# Patient Record
Sex: FEMALE | Race: WHITE | Employment: PART TIME | URBAN - METROPOLITAN AREA
[De-identification: names, ages, dates, MRNs, and addresses within clinical notes are randomized per-mention and may not be internally consistent; named-entity substitution may affect disease eponyms.]

---

## 2017-07-31 LAB
ABO + RH BLD: NORMAL
ABO + RH BLD: NORMAL
HBV SURFACE AG SERPL QL IA: NON REACTIVE
HIV 1+2 AB+HIV1 P24 AG SERPL QL IA: NON REACTIVE
RUBELLA ABY IGG: NORMAL

## 2017-11-01 ENCOUNTER — TRANSFERRED RECORDS (OUTPATIENT)
Dept: HEALTH INFORMATION MANAGEMENT | Facility: CLINIC | Age: 27
End: 2017-11-01

## 2017-11-01 LAB — PAP SMEAR - HIM PATIENT REPORTED: NEGATIVE

## 2018-01-07 ENCOUNTER — HEALTH MAINTENANCE LETTER (OUTPATIENT)
Age: 28
End: 2018-01-07

## 2018-01-22 LAB — GROUP B STREP PCR: NORMAL

## 2018-02-17 ENCOUNTER — HOSPITAL ENCOUNTER (OUTPATIENT)
Facility: CLINIC | Age: 28
Discharge: HOME | End: 2018-02-18
Attending: OBSTETRICS & GYNECOLOGY | Admitting: OBSTETRICS & GYNECOLOGY
Payer: COMMERCIAL

## 2018-02-17 PROCEDURE — 59025 FETAL NON-STRESS TEST: CPT | Mod: 26 | Performed by: OBSTETRICS & GYNECOLOGY

## 2018-02-17 PROCEDURE — 59025 FETAL NON-STRESS TEST: CPT

## 2018-02-17 PROCEDURE — G0463 HOSPITAL OUTPT CLINIC VISIT: HCPCS | Mod: 25

## 2018-02-17 RX ORDER — FERROUS SULFATE 325(65) MG
325 TABLET ORAL
Status: ON HOLD | COMMUNITY
End: 2018-02-20

## 2018-02-17 RX ORDER — ONDANSETRON 2 MG/ML
4 INJECTION INTRAMUSCULAR; INTRAVENOUS EVERY 6 HOURS PRN
Status: DISCONTINUED | OUTPATIENT
Start: 2018-02-17 | End: 2018-02-18 | Stop reason: HOSPADM

## 2018-02-17 NOTE — IP AVS SNAPSHOT
MRN:[id number]                      After Visit Summary   2/17/2018    Elvia Dickens    MRN: [id number]           Thank you!     Thank you for choosing [facility] for your care. Our goal is always to provide you with excellent care. Hearing back from our patients is one way we can continue to improve our services. Please take a few minutes to complete the written survey that you may receive in the mail after you visit with us. Thank you!        Patient Information     Date Of Birth          1990        About your hospital stay     You were admitted on:  February 17, 2018 You last received care in the:  [facility]    You were discharged on:  February 17, 2018       Who to Call     For medical emergencies, please call 911.  For non-urgent questions about your medical care, please call your primary care provider or clinic, [phone]          Attending Provider     Provider Ruben Newton MD OB/Gyn    Alexandra Martinez MD OB/Gyn       Primary Care Provider Office Phone # Fax #    Ary Sandhu [phone]      Further instructions from your care team       Discharge Instruction for Undelivered Patients      You were seen for: Labor Assessment  We Consulted: Dr Enrique  You had (Test or Medicine):non stress test,      Diet:   Drink 8 to 12 glasses of liquids (milk, juice, water) every day.  You may eat meals and snacks.     Activity:  Count fetal kicks everyday (see handout)  Call your doctor or nurse midwife if your baby is moving less than usual.     Call your provider if you notice:  Swelling in your face or increased swelling in your hands or legs.  Headaches that are not relieved by Tylenol (acetaminophen).  Changes in your vision (blurring: seeing spots or stars.)  Nausea (sick to your stomach) and vomiting (throwing up).   Weight gain of 5 pounds or more per week.  Heartburn that doesn't go away.  Signs of bladder infection: pain  "when you urinate (use the toilet), need to go more often and more urgently.  The bag of aviles (rupture of membranes) breaks, or you notice leaking in your underwear.  Bright red blood in your underwear.  Abdominal (lower belly) or stomach pain.  For first baby: Contractions (tightening) less than 5 minutes apart for one hour or more.  Second (plus) baby: Contractions (tightening) less than 10 minutes apart and getting stronger.  *If less than 34 weeks: Contractions (tightenings) more than 6 times in one hour.  Increase or change in vaginal discharge (note the color and amount)  You may have spotting from cervical check, this is normal      Follow-up:  As scheduled in the clinic          Pending Results     No orders found from 2/15/2018 to 2018.            Admission Information     Date & Time Provider Department Dept. Phone    2018 [phone]      Your Vitals Were     Last Period                   2017           [facility] Information     [facility] lets you send messages to your doctor, view your test results, renew your prescriptions, schedule appointments and more. To sign up, go to [email or web address] . Click on \"Log in\" on the left side of the screen, which will take you to the Welcome page. Then click on \"Sign up Now\" on the right side of the page.     You will be asked to enter the access code listed below, as well as some personal information. Please follow the directions to create your username and password.     Your access code is: [email or web address]  Expires: 2018 11:48 PM     Your access code will  in 90 days. If you need help or a new code, please call your [facility] clinic or [phone].        Care EveryWhere ID     This is your Care EveryWhere ID. This could be used by other organizations to access your [facility] medical records  [id number]        Equal Access to Services     LIYA CABRERA AH: Yash Cuello, " wapriscilada lukelsiadaha, qaybta kaalmada ernesto, liberty shraddhamone jimenez khalifdesirae jallohaaadalberto ah. So [facility] [phone].    ATENCIÓN: Si mata montez, tiene a [place] disposición servicios gratuitos de asistencia lingüística. Llame al [phone].    We comply with applicable federal civil rights laws and [place] laws. We do not discriminate on the basis of race, color, national origin, age, disability, sex, sexual orientation, or gender identity.               Review of your medicines      UNREVIEWED medicines. Ask your doctor about these medicines        Dose / Directions    ferrous sulfate 325 (65 FE) MG tablet   Commonly known as:  IRON        Dose:  325 mg   Take 325 mg by mouth daily (with breakfast)   Refills:  0       hydrocortisone 2.5 % cream   Commonly known as:  ANUSOL-HC        Place rectally 3 times daily   Refills:  0                Protect others around you: Learn how to safely use, store and throw away your medicines at www.disposemymeds.org.             Medication List: This is a list of all your medications and when to take them. Check marks below indicate your daily home schedule. Keep this list as a reference.      Medications           Morning Afternoon Evening Bedtime As Needed    ferrous sulfate 325 (65 FE) MG tablet   Commonly known as:  IRON   Take 325 mg by mouth daily (with breakfast)                                hydrocortisone 2.5 % cream   Commonly known as:  ANUSOL-HC   Place rectally 3 times daily

## 2018-02-17 NOTE — IP AVS SNAPSHOT
[address], [address]    [facility] [address]    Phone:  [phone]                                       After Visit Summary   2/17/2018    Elvia Dickens    MRN: [id number]           After Visit Summary Signature Page     I have received my discharge instructions, and my questions have been answered. I have discussed any challenges I see with this plan with the nurse or doctor.    ..........................................................................................................................................  Patient/Patient Representative Signature      ..........................................................................................................................................  Patient Representative Print Name and Relationship to Patient    ..................................................               ................................................  Date                                            Time    ..........................................................................................................................................  Reviewed by Signature/Title    ...................................................              ..............................................  Date                                                            Time

## 2018-02-18 ENCOUNTER — ANESTHESIA EVENT (OUTPATIENT)
Dept: OBGYN | Facility: CLINIC | Age: 28
End: 2018-02-18
Payer: COMMERCIAL

## 2018-02-18 ENCOUNTER — ANESTHESIA (OUTPATIENT)
Dept: OBGYN | Facility: CLINIC | Age: 28
End: 2018-02-18
Payer: COMMERCIAL

## 2018-02-18 ENCOUNTER — HOSPITAL ENCOUNTER (INPATIENT)
Facility: CLINIC | Age: 28
LOS: 2 days | Discharge: HOME | End: 2018-02-20
Attending: OBSTETRICS & GYNECOLOGY | Admitting: OBSTETRICS & GYNECOLOGY
Payer: COMMERCIAL

## 2018-02-18 VITALS — TEMPERATURE: 97.7 F | DIASTOLIC BLOOD PRESSURE: 70 MMHG | SYSTOLIC BLOOD PRESSURE: 109 MMHG

## 2018-02-18 LAB
ABO + RH BLD: NORMAL
ABO + RH BLD: NORMAL
BLD GP AB SCN SERPL QL: NORMAL
BLOOD BANK CMNT PATIENT-IMP: NORMAL
HGB BLD-MCNC: 9.9 G/DL (ref 11.7–15.7)
SPECIMEN EXP DATE BLD: NORMAL
T PALLIDUM IGG+IGM SER QL: NEGATIVE

## 2018-02-18 PROCEDURE — 27110038 ZZH RX 271: Performed by: ANESTHESIOLOGY

## 2018-02-18 PROCEDURE — 0KQM0ZZ REPAIR PERINEUM MUSCLE, OPEN APPROACH: ICD-10-PCS | Performed by: OBSTETRICS & GYNECOLOGY

## 2018-02-18 PROCEDURE — 86900 BLOOD TYPING SEROLOGIC ABO: CPT | Performed by: OBSTETRICS & GYNECOLOGY

## 2018-02-18 PROCEDURE — 72200001 ZZH LABOR CARE VAGINAL DELIVERY SINGLE

## 2018-02-18 PROCEDURE — 25000125 ZZHC RX 250: Performed by: OBSTETRICS & GYNECOLOGY

## 2018-02-18 PROCEDURE — 25000128 H RX IP 250 OP 636: Performed by: ANESTHESIOLOGY

## 2018-02-18 PROCEDURE — 86901 BLOOD TYPING SEROLOGIC RH(D): CPT | Performed by: OBSTETRICS & GYNECOLOGY

## 2018-02-18 PROCEDURE — 25000128 H RX IP 250 OP 636: Performed by: OBSTETRICS & GYNECOLOGY

## 2018-02-18 PROCEDURE — 86780 TREPONEMA PALLIDUM: CPT | Performed by: OBSTETRICS & GYNECOLOGY

## 2018-02-18 PROCEDURE — 25000132 ZZH RX MED GY IP 250 OP 250 PS 637: Performed by: OBSTETRICS & GYNECOLOGY

## 2018-02-18 PROCEDURE — 25000125 ZZHC RX 250: Performed by: ANESTHESIOLOGY

## 2018-02-18 PROCEDURE — 00HU33Z INSERTION OF INFUSION DEVICE INTO SPINAL CANAL, PERCUTANEOUS APPROACH: ICD-10-PCS | Performed by: ANESTHESIOLOGY

## 2018-02-18 PROCEDURE — 85018 HEMOGLOBIN: CPT | Performed by: OBSTETRICS & GYNECOLOGY

## 2018-02-18 PROCEDURE — 88307 TISSUE EXAM BY PATHOLOGIST: CPT | Mod: 26 | Performed by: OBSTETRICS & GYNECOLOGY

## 2018-02-18 PROCEDURE — 86850 RBC ANTIBODY SCREEN: CPT | Performed by: OBSTETRICS & GYNECOLOGY

## 2018-02-18 PROCEDURE — 12000037 ZZH R&B POSTPARTUM INTERMEDIATE

## 2018-02-18 PROCEDURE — 3E0R3BZ INTRODUCTION OF ANESTHETIC AGENT INTO SPINAL CANAL, PERCUTANEOUS APPROACH: ICD-10-PCS | Performed by: ANESTHESIOLOGY

## 2018-02-18 PROCEDURE — 36415 COLL VENOUS BLD VENIPUNCTURE: CPT | Performed by: OBSTETRICS & GYNECOLOGY

## 2018-02-18 PROCEDURE — G0463 HOSPITAL OUTPT CLINIC VISIT: HCPCS | Mod: 25

## 2018-02-18 PROCEDURE — 59025 FETAL NON-STRESS TEST: CPT

## 2018-02-18 PROCEDURE — 88307 TISSUE EXAM BY PATHOLOGIST: CPT | Performed by: OBSTETRICS & GYNECOLOGY

## 2018-02-18 PROCEDURE — 37000011 ZZH ANESTHESIA WARD SERVICE

## 2018-02-18 RX ORDER — CARBOPROST TROMETHAMINE 250 UG/ML
250 INJECTION, SOLUTION INTRAMUSCULAR
Status: DISCONTINUED | OUTPATIENT
Start: 2018-02-18 | End: 2018-02-18

## 2018-02-18 RX ORDER — LIDOCAINE 40 MG/G
CREAM TOPICAL
Status: DISCONTINUED | OUTPATIENT
Start: 2018-02-18 | End: 2018-02-18

## 2018-02-18 RX ORDER — SODIUM CHLORIDE, SODIUM LACTATE, POTASSIUM CHLORIDE, CALCIUM CHLORIDE 600; 310; 30; 20 MG/100ML; MG/100ML; MG/100ML; MG/100ML
INJECTION, SOLUTION INTRAVENOUS CONTINUOUS
Status: DISCONTINUED | OUTPATIENT
Start: 2018-02-18 | End: 2018-02-18

## 2018-02-18 RX ORDER — IBUPROFEN 400 MG/1
800 TABLET, FILM COATED ORAL EVERY 6 HOURS PRN
Status: DISCONTINUED | OUTPATIENT
Start: 2018-02-18 | End: 2018-02-20 | Stop reason: HOSPADM

## 2018-02-18 RX ORDER — ONDANSETRON 2 MG/ML
4 INJECTION INTRAMUSCULAR; INTRAVENOUS EVERY 6 HOURS PRN
Status: DISCONTINUED | OUTPATIENT
Start: 2018-02-18 | End: 2018-02-18

## 2018-02-18 RX ORDER — OXYTOCIN/0.9 % SODIUM CHLORIDE 30/500 ML
100 PLASTIC BAG, INJECTION (ML) INTRAVENOUS CONTINUOUS
Status: DISCONTINUED | OUTPATIENT
Start: 2018-02-18 | End: 2018-02-20 | Stop reason: HOSPADM

## 2018-02-18 RX ORDER — MISOPROSTOL 200 UG/1
800 TABLET ORAL
Status: DISCONTINUED | OUTPATIENT
Start: 2018-02-18 | End: 2018-02-20 | Stop reason: HOSPADM

## 2018-02-18 RX ORDER — METHYLERGONOVINE MALEATE 0.2 MG/ML
200 INJECTION INTRAVENOUS
Status: DISCONTINUED | OUTPATIENT
Start: 2018-02-18 | End: 2018-02-18

## 2018-02-18 RX ORDER — NALOXONE HYDROCHLORIDE 0.4 MG/ML
.1-.4 INJECTION, SOLUTION INTRAMUSCULAR; INTRAVENOUS; SUBCUTANEOUS
Status: DISCONTINUED | OUTPATIENT
Start: 2018-02-18 | End: 2018-02-18

## 2018-02-18 RX ORDER — HYDROCORTISONE 2.5 %
CREAM (GRAM) TOPICAL 3 TIMES DAILY PRN
Status: DISCONTINUED | OUTPATIENT
Start: 2018-02-18 | End: 2018-02-20 | Stop reason: HOSPADM

## 2018-02-18 RX ORDER — LIDOCAINE HCL/EPINEPHRINE/PF 2%-1:200K
VIAL (ML) INJECTION
Status: DISCONTINUED
Start: 2018-02-18 | End: 2018-02-18 | Stop reason: HOSPADM

## 2018-02-18 RX ORDER — AMOXICILLIN 250 MG
2 CAPSULE ORAL 2 TIMES DAILY PRN
Status: DISCONTINUED | OUTPATIENT
Start: 2018-02-18 | End: 2018-02-20 | Stop reason: HOSPADM

## 2018-02-18 RX ORDER — OXYTOCIN 10 [USP'U]/ML
10 INJECTION, SOLUTION INTRAMUSCULAR; INTRAVENOUS
Status: DISCONTINUED | OUTPATIENT
Start: 2018-02-18 | End: 2018-02-18

## 2018-02-18 RX ORDER — ACETAMINOPHEN 325 MG/1
650 TABLET ORAL EVERY 4 HOURS PRN
Status: DISCONTINUED | OUTPATIENT
Start: 2018-02-18 | End: 2018-02-18

## 2018-02-18 RX ORDER — DIPHENHYDRAMINE HCL 25 MG
25 CAPSULE ORAL
COMMUNITY
End: 2018-05-18

## 2018-02-18 RX ORDER — AMOXICILLIN 250 MG
1 CAPSULE ORAL 2 TIMES DAILY PRN
Status: DISCONTINUED | OUTPATIENT
Start: 2018-02-18 | End: 2018-02-20 | Stop reason: HOSPADM

## 2018-02-18 RX ORDER — ROPIVACAINE HYDROCHLORIDE 2 MG/ML
10 INJECTION, SOLUTION EPIDURAL; INFILTRATION; PERINEURAL ONCE
Status: COMPLETED | OUTPATIENT
Start: 2018-02-18 | End: 2018-02-18

## 2018-02-18 RX ORDER — EPHEDRINE SULFATE 50 MG/ML
5 INJECTION, SOLUTION INTRAMUSCULAR; INTRAVENOUS; SUBCUTANEOUS
Status: DISCONTINUED | OUTPATIENT
Start: 2018-02-18 | End: 2018-02-18

## 2018-02-18 RX ORDER — OXYCODONE HYDROCHLORIDE 5 MG/1
5 TABLET ORAL EVERY 4 HOURS PRN
Status: DISCONTINUED | OUTPATIENT
Start: 2018-02-18 | End: 2018-02-20 | Stop reason: HOSPADM

## 2018-02-18 RX ORDER — NALBUPHINE HYDROCHLORIDE 10 MG/ML
2.5-5 INJECTION, SOLUTION INTRAMUSCULAR; INTRAVENOUS; SUBCUTANEOUS EVERY 6 HOURS PRN
Status: DISCONTINUED | OUTPATIENT
Start: 2018-02-18 | End: 2018-02-18

## 2018-02-18 RX ORDER — ONDANSETRON 4 MG/1
4 TABLET, ORALLY DISINTEGRATING ORAL EVERY 6 HOURS PRN
Status: DISCONTINUED | OUTPATIENT
Start: 2018-02-18 | End: 2018-02-18

## 2018-02-18 RX ORDER — BISACODYL 10 MG
10 SUPPOSITORY, RECTAL RECTAL DAILY PRN
Status: DISCONTINUED | OUTPATIENT
Start: 2018-02-20 | End: 2018-02-20 | Stop reason: HOSPADM

## 2018-02-18 RX ORDER — NALOXONE HYDROCHLORIDE 0.4 MG/ML
.1-.4 INJECTION, SOLUTION INTRAMUSCULAR; INTRAVENOUS; SUBCUTANEOUS
Status: DISCONTINUED | OUTPATIENT
Start: 2018-02-18 | End: 2018-02-20 | Stop reason: HOSPADM

## 2018-02-18 RX ORDER — LIDOCAINE HYDROCHLORIDE AND EPINEPHRINE 15; 5 MG/ML; UG/ML
3 INJECTION, SOLUTION EPIDURAL
Status: COMPLETED | OUTPATIENT
Start: 2018-02-18 | End: 2018-02-18

## 2018-02-18 RX ORDER — IBUPROFEN 400 MG/1
800 TABLET, FILM COATED ORAL
Status: DISCONTINUED | OUTPATIENT
Start: 2018-02-18 | End: 2018-02-18

## 2018-02-18 RX ORDER — OXYTOCIN/0.9 % SODIUM CHLORIDE 30/500 ML
1-24 PLASTIC BAG, INJECTION (ML) INTRAVENOUS CONTINUOUS
Status: DISCONTINUED | OUTPATIENT
Start: 2018-02-18 | End: 2018-02-18

## 2018-02-18 RX ORDER — OXYCODONE AND ACETAMINOPHEN 5; 325 MG/1; MG/1
1 TABLET ORAL
Status: DISCONTINUED | OUTPATIENT
Start: 2018-02-18 | End: 2018-02-18

## 2018-02-18 RX ORDER — OXYTOCIN/0.9 % SODIUM CHLORIDE 30/500 ML
340 PLASTIC BAG, INJECTION (ML) INTRAVENOUS CONTINUOUS PRN
Status: DISCONTINUED | OUTPATIENT
Start: 2018-02-18 | End: 2018-02-20 | Stop reason: HOSPADM

## 2018-02-18 RX ORDER — OXYTOCIN 10 [USP'U]/ML
10 INJECTION, SOLUTION INTRAMUSCULAR; INTRAVENOUS
Status: DISCONTINUED | OUTPATIENT
Start: 2018-02-18 | End: 2018-02-20 | Stop reason: HOSPADM

## 2018-02-18 RX ORDER — ACETAMINOPHEN 325 MG/1
650 TABLET ORAL EVERY 4 HOURS PRN
Status: DISCONTINUED | OUTPATIENT
Start: 2018-02-18 | End: 2018-02-20 | Stop reason: HOSPADM

## 2018-02-18 RX ORDER — LANOLIN 100 %
OINTMENT (GRAM) TOPICAL
Status: DISCONTINUED | OUTPATIENT
Start: 2018-02-18 | End: 2018-02-20 | Stop reason: HOSPADM

## 2018-02-18 RX ORDER — OXYTOCIN/0.9 % SODIUM CHLORIDE 30/500 ML
100-340 PLASTIC BAG, INJECTION (ML) INTRAVENOUS CONTINUOUS PRN
Status: COMPLETED | OUTPATIENT
Start: 2018-02-18 | End: 2018-02-18

## 2018-02-18 RX ORDER — LIDOCAINE HYDROCHLORIDE 20 MG/ML
INJECTION, SOLUTION INFILTRATION; PERINEURAL PRN
Status: DISCONTINUED | OUTPATIENT
Start: 2018-02-18 | End: 2018-02-19 | Stop reason: HOSPADM

## 2018-02-18 RX ORDER — FENTANYL CITRATE 50 UG/ML
50-100 INJECTION, SOLUTION INTRAMUSCULAR; INTRAVENOUS
Status: DISCONTINUED | OUTPATIENT
Start: 2018-02-18 | End: 2018-02-18

## 2018-02-18 RX ADMIN — SODIUM CHLORIDE, POTASSIUM CHLORIDE, SODIUM LACTATE AND CALCIUM CHLORIDE: 600; 310; 30; 20 INJECTION, SOLUTION INTRAVENOUS at 12:58

## 2018-02-18 RX ADMIN — Medication 5 MG: at 08:04

## 2018-02-18 RX ADMIN — Medication 12 ML/HR: at 04:29

## 2018-02-18 RX ADMIN — SODIUM CHLORIDE, POTASSIUM CHLORIDE, SODIUM LACTATE AND CALCIUM CHLORIDE 1000 ML: 600; 310; 30; 20 INJECTION, SOLUTION INTRAVENOUS at 03:53

## 2018-02-18 RX ADMIN — Medication 5 MG: at 07:59

## 2018-02-18 RX ADMIN — SODIUM CHLORIDE, POTASSIUM CHLORIDE, SODIUM LACTATE AND CALCIUM CHLORIDE: 600; 310; 30; 20 INJECTION, SOLUTION INTRAVENOUS at 06:30

## 2018-02-18 RX ADMIN — ROPIVACAINE HYDROCHLORIDE 10 ML: 2 INJECTION, SOLUTION EPIDURAL; INFILTRATION at 09:52

## 2018-02-18 RX ADMIN — IBUPROFEN 800 MG: 400 TABLET ORAL at 16:11

## 2018-02-18 RX ADMIN — IBUPROFEN 800 MG: 400 TABLET ORAL at 23:34

## 2018-02-18 RX ADMIN — SODIUM CHLORIDE, POTASSIUM CHLORIDE, SODIUM LACTATE AND CALCIUM CHLORIDE: 600; 310; 30; 20 INJECTION, SOLUTION INTRAVENOUS at 04:32

## 2018-02-18 RX ADMIN — LIDOCAINE HYDROCHLORIDE AND EPINEPHRINE 3 ML: 15; 5 INJECTION, SOLUTION EPIDURAL at 04:26

## 2018-02-18 RX ADMIN — SODIUM CHLORIDE, POTASSIUM CHLORIDE, SODIUM LACTATE AND CALCIUM CHLORIDE 250 ML: 600; 310; 30; 20 INJECTION, SOLUTION INTRAVENOUS at 07:37

## 2018-02-18 RX ADMIN — ROPIVACAINE HYDROCHLORIDE 10 ML: 2 INJECTION, SOLUTION EPIDURAL; INFILTRATION at 04:30

## 2018-02-18 RX ADMIN — OXYTOCIN-SODIUM CHLORIDE 0.9% IV SOLN 30 UNIT/500ML 2 MILLI-UNITS/MIN: 30-0.9/5 SOLUTION at 08:40

## 2018-02-18 RX ADMIN — SENNOSIDES AND DOCUSATE SODIUM 1 TABLET: 8.6; 5 TABLET ORAL at 23:34

## 2018-02-18 RX ADMIN — Medication 5 MG: at 07:55

## 2018-02-18 RX ADMIN — HYDROCORTISONE: 25 CREAM TOPICAL at 23:34

## 2018-02-18 RX ADMIN — Medication 5 MG: at 04:56

## 2018-02-18 RX ADMIN — OXYTOCIN-SODIUM CHLORIDE 0.9% IV SOLN 30 UNIT/500ML 340 ML/HR: 30-0.9/5 SOLUTION at 14:10

## 2018-02-18 RX ADMIN — ACETAMINOPHEN 650 MG: 325 TABLET, FILM COATED ORAL at 16:11

## 2018-02-18 RX ADMIN — OXYTOCIN-SODIUM CHLORIDE 0.9% IV SOLN 30 UNIT/500ML 100 ML/HR: 30-0.9/5 SOLUTION at 14:41

## 2018-02-18 RX ADMIN — LIDOCAINE HYDROCHLORIDE 10 ML: 20 INJECTION, SOLUTION INFILTRATION; PERINEURAL at 12:47

## 2018-02-18 RX ADMIN — ACETAMINOPHEN 650 MG: 325 TABLET, FILM COATED ORAL at 23:34

## 2018-02-18 RX ADMIN — LIDOCAINE HYDROCHLORIDE 10 ML: 20 INJECTION, SOLUTION INFILTRATION; PERINEURAL at 10:30

## 2018-02-18 NOTE — IP AVS SNAPSHOT
MRN:[id number]                      After Visit Summary   2/18/2018    Elvia Dickens    MRN: [id number]           Thank you!     Thank you for choosing [facility] for your care. Our goal is always to provide you with excellent care. Hearing back from our patients is one way we can continue to improve our services. Please take a few minutes to complete the written survey that you may receive in the mail after you visit with us. Thank you!        Patient Information     Date Of Birth          1990        About your hospital stay     You were admitted on:  February 18, 2018 You last received care in the:  [address]    You were discharged on:  February 20, 2018       Who to Call     For medical emergencies, please call 911.  For non-urgent questions about your medical care, please call your primary care provider or clinic, [phone]          Attending Provider     Provider Ruben Newton MD OB/Gyn    Alexandra Martinez MD OB/Gyn       Primary Care Provider Office Phone # Fax #    Ary Sandhu [phone]      Further instructions from your care team       Postpartum Vaginal Delivery Instructions    Activity       Ask family and friends for help when you need it.    Do not place anything in your vagina for 6 weeks.    You are not restricted on other activities, but take it easy for a few weeks to allow your body to recover from delivery.  You are able to do any activities you feel up to that point.    No driving until you have stopped taking your pain medications (usually two weeks after delivery).     Call your health care provider if you have any of these symptoms:       Increased pain, swelling, redness, or fluid around your stiches from an episiotomy or perineal tear.    A fever above 100.4 F (38 C) with or without chills when placing a thermometer under your tongue.    You soak a sanitary pad with blood within 1  hour, or you see blood clots larger than a golf ball.    Bleeding that lasts more than 6 weeks.    Vaginal discharge that smells bad.    Severe pain, cramping or tenderness in your lower belly area.    A need to urinate more frequently (use the toilet more often), more urgently (use the toilet very quickly), or it burns when you urinate.    Nausea and vomiting.    Redness, swelling or pain around a vein in your leg.    Problems breastfeeding or a red or painful area on your breast.    Chest pain and cough or are gasping for air.    Problems coping with sadness, anxiety, or depression.  If you have any concerns about hurting yourself or the baby, call your provider immediately.     You have questions or concerns after you return home.     Keep your hands clean:  Always wash your hands before touching your perineal area and stitches.  This helps reduce your risk of infection.  If your hands aren't dirty, you may use an alcohol hand-rub to clean your hands. Keep your nails clean and short.    Discharge Instructions    You should set up an appointment to see your doctor in 6 weeks after delivery for a postpartum exam.   You should also call if you develop any heavy vaginal bleeding worse than a menstrual period, or fever over 100.5 degrees, or vomiting or increased pain.    You should abstain from intercourse for six weeks after delivery. You should avoid a bath for 1 week after delivery but showering is ok. If you have any questions about yourself or the baby, feel free to call or if any urgent concerns after hours, please go to the emergency room.    Alexandra Martinez MD    Pending Results     Date and Time Order Name Status Description    2/18/2018 1426 Placenta Path Order and Indications (PLACENTA) In process             Statement of Approval     Ordered          02/20/18 0956  I have reviewed and agree with all the recommendations and orders detailed in this document.  EFFECTIVE NOW     Approved and  "electronically signed by:  Alexandra Martinez MD             Admission Information     Date & Time Provider Department Dept. Phone    2018 Alexandra Martinez MD [address] [phone]      Your Vitals Were     Blood Pressure Pulse Temperature Respirations Height Weight    114/66 (BP Location: Right arm) 117 98.5  F (36.9  C) (Axillary) 16 1.575 m (5' 2\") 72.6 kg (160 lb)    Last Period Pulse Oximetry BMI (Body Mass Index)             2017 99% 29.26 kg/m2         MyChart Information     [facility] lets you send messages to your doctor, view your test results, renew your prescriptions, schedule appointments and more. To sign up, go to [email or web address] . Click on \"Log in\" on the left side of the screen, which will take you to the Welcome page. Then click on \"Sign up Now\" on the right side of the page.     You will be asked to enter the access code listed below, as well as some personal information. Please follow the directions to create your username and password.     Your access code is: [email or web address]  Expires: 2018 11:48 PM     Your access code will  in 90 days. If you need help or a new code, please call your [facility] [place] clinic or [phone].        Care EveryWhere ID     This is your Care EveryWhere ID. This could be used by other organizations to access your [facility] [place] medical records  [id number]        Equal Access to Services     LIYA CABRERA : Hadmary beth Cuello, waaxda luqadaha, qaybta kaalmada liberty orozco. So [facility] [phone].    ATENCIÓN: Si habla español, tiene a [place] disposición servicios gratuitos de asistencia lingüística. Llame al [phone].    We comply with applicable federal civil rights laws and [place] laws. We do not discriminate on the basis of race, color, national origin, age, disability, sex, sexual orientation, or gender identity.               Review of your medicines "      START taking        Dose / Directions    ibuprofen 600 MG tablet   Commonly known as:  ADVIL/MOTRIN        Dose:  600 mg   Take 1 tablet (600 mg) by mouth every 6 hours as needed for moderate pain   Quantity:  90 tablet   Refills:  1         CONTINUE these medicines which have NOT CHANGED        Dose / Directions    BENADRYL 25 MG capsule   Generic drug:  diphenhydrAMINE        Dose:  25 mg   Take 25 mg by mouth nightly as needed for itching, allergies or sleep   Refills:  0       hydrocortisone 2.5 % cream   Commonly known as:  ANUSOL-HC        Place rectally 3 times daily   Refills:  0       ZANTAC PO        Dose:  75 mg   Take 75 mg by mouth 2 times daily as needed for heartburn   Refills:  0         STOP taking     ferrous sulfate 325 (65 FE) MG tablet   Commonly known as:  IRON                Where to get your medicines      Some of these will need a paper prescription and others can be bought over the counter. Ask your nurse if you have questions.     Bring a paper prescription for each of these medications     ibuprofen 600 MG tablet                Protect others around you: Learn how to safely use, store and throw away your medicines at www.disposemymeds.org.             Medication List: This is a list of all your medications and when to take them. Check marks below indicate your daily home schedule. Keep this list as a reference.      Medications           Morning Afternoon Evening Bedtime As Needed    BENADRYL 25 MG capsule   Take 25 mg by mouth nightly as needed for itching, allergies or sleep   Generic drug:  diphenhydrAMINE                                hydrocortisone 2.5 % cream   Commonly known as:  ANUSOL-HC   Place rectally 3 times daily                                ibuprofen 600 MG tablet   Commonly known as:  ADVIL/MOTRIN   Take 1 tablet (600 mg) by mouth every 6 hours as needed for moderate pain   Last time this was given:  800 mg on 2/20/2018  9:39 AM                                 ZANTAC PO   Take 75 mg by mouth 2 times daily as needed for heartburn

## 2018-02-18 NOTE — PLAN OF CARE
Data: Elvia Dickens transferred to 409 via wheelchair at 1715. Baby transferred via parent's arms.  Action: Receiving unit notified of transfer: Yes. Patient and family notified of room change. Report given to FAITH Araujo RN at 1725. Belongings sent to receiving unit. Accompanied by Registered Nurse. Oriented patient to surroundings. Call light within reach. ID bands double-checked with receiving RN.  Response: Patient tolerated transfer and is stable.

## 2018-02-18 NOTE — PLAN OF CARE
Problem: Labor (Cervical Ripen, Induct, Augment) (Adult,Obstetrics,Pediatric)  Goal: Signs and Symptoms of Listed Potential Problems Will be Absent, Minimized or Managed (Labor)  Signs and symptoms of listed potential problems will be absent, minimized or managed by discharge/transition of care (reference Labor (Cervical Ripen, Induct, Augment) (Adult,Obstetrics,Pediatric) CPG).   Outcome: Completed Date Met: 18   of viable baby boy at 1405.  Pt had epidural for pain relief, but after second redose no relief of pain.  Pt desired to push, started at 1338 due to pt saying it felt better to push.  Dr. Martinez present for delivery.

## 2018-02-18 NOTE — PLAN OF CARE
0415 ropivicaine handed off to Dr Lopez.  Epidural cadd, key, and ephedrine handed off to Keisha GIRARD Rn

## 2018-02-18 NOTE — PLAN OF CARE
Problem: Labor (Cervical Ripen, Induct, Augment) (Adult,Obstetrics,Pediatric)  Goal: Signs and Symptoms of Listed Potential Problems Will be Absent, Minimized or Managed (Labor)  Signs and symptoms of listed potential problems will be absent, minimized or managed by discharge/transition of care (reference Labor (Cervical Ripen, Induct, Augment) (Adult,Obstetrics,Pediatric) CPG).   Outcome: Improving  0341-pt admited to labor and delivery.    0350-IV placed and LR bolus started for epidural.  0415-Dr. Lopez at bedside to place epidural.  Consents signed.  Timeout done.  0428-Pt repositioned to left tilt.  0445 to 0500-Recurrent lates.  IV bolus, O2, repositoned multiple times, ephedrine 5mg given.  Resolved after interventions.  0520-O2 removed.  0525-Iraheta catheter placed.  0530-Pt repositioned.

## 2018-02-18 NOTE — PROCEDURES
Admission date: 2018  Delivery date:  18  Place of delivery: [facility]    The patient is a 27 year old  at 38w6d  wks gestation admitted to labor and delivery in active labor.  Her  course was complicated by bilateral renal pyelectasis - 14 mm (left) and 6 mm (right).  The estimate fetal weight is 7.5#.      For pain management she had an epidural with poor relief - it was rebolused twice with little benefit.  Pitocin was titrated per protocol.   Membranes ruptured spontaneously and the fluid was clear.    She progressed to complete dilation at 1:30 pm.  She had excellent maternal expulsive efforts, and after 27 minutes of pushing,  she delivered a viable male infant weighing 7 pounds 6 ounces with apgars of 8 at 1 min and 9 at 5 minutes, via a normal spontaneous vaginal delivery over an intact perineum.  The infant was vigorous, and mouth and nose were bulb suctioned upon delivery.  The infant was handed off to his/her parents and the nursery team in attendance.    The placenta delivered spontaneously and intact.  The uterus was made firm with IV pitocin and uterine massage.  The estimated blood loss was 250 mL.    The cervix and vagina were inspected.  There was a second degree perineal laceration which was repaired with 3-0 vicryl assuring hemostasis and close approximation.  The patient tolerated this well.     During labor the fetal heart tones were category 1 to 2.    Mother and baby did well and went to normal postpartum and  nursery.    GBS was negative.     Alexandra Martinez MD

## 2018-02-18 NOTE — H&P
"Labor and delivery admit note.  HPI  Elvia Dickens  is a 27 year old   at 38w5d who  who was admitted with spontaneous onset of labor contractions.  She was 3/75/-2 when previously she was 1 cm dilated when ruled out for labor earlier this evening.     Estimated Date of Delivery: 2018  38w6d  Prenatal care - early and adequate with Dr. 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    Past Medical History:   Diagnosis Date     Anemia      Charcot-Sara-Tooth disease      History reviewed. No pertinent surgical history.  Social History   Substance Use Topics     Smoking status: Never Smoker     Smokeless tobacco: Never Used     Alcohol use No       Objective  Alert and oriented  /72  Temp 98.6  F (37  C) (Temporal)  Resp 14  Ht 1.575 m (5' 2\")  Wt 72.6 kg (160 lb)  LMP 2017  SpO2 100%  BMI 29.26 kg/m2  Heart and Lungs- normal  PA- uterus full term. Contractions + q 2-3 mins  FHR- 130 baseline, good variability, accels +. Mild variables seen.  Pelvic ( by admitting RN )  Cx: 3/75/-2    Assessment/Plan:   at 39 weeks- active labor  Uneventful prenatal course.    Plan  Admit.  Expectant.  Routine intrapartum management.    Alexandra Martinez MD  "

## 2018-02-18 NOTE — PLAN OF CARE
"2302:  presents at 38 6/7 weeks gestation to MAC with c/o \"uterine contractions [id number]\". External monitors applied after verbal consent by patient. Patient and spouse oriented to room, call light given to patient, plan of care discussed.. Vital signs obtained and WNL. Admission questions answered by patient.  2320: SVE /-2 Patient having uterine contractions, not feeling every contraction and rates contractions a '1' on 1-10 scale. Patient states contractions feel like \"menstrual cramps\"  2340: Dr. Enrique updated regarding uterine contractions, pain level, SVE and fetal heart tones. Patient to be discharged to home, instructed to take Benadryl at home to facilitate sleep.  0015: External monitors removed. Patient dressed and all belongings gathered. Discharge instructions discussed with patient. Patient instructed to return when uterine contractions are every 5 minutes with increased intensity, LOF or change in vaginal discharge including any vaginal bleeding, change in fetal movement. Patient and spouse in agreement with plan. To home ambulatory with spouse.    "

## 2018-02-18 NOTE — PLAN OF CARE
Data: Patient presented to [facility] at 0330.   Reason for maternal/fetal assessment per patient is Laboring  Patient is a . Prenatal record reviewed.   Gestational Age 38w6d. VSS. Fetal movement present. Patient denies cramping, backache, vaginal discharge, pelvic pressure, UTI symptoms, GI problems, vaginal bleeding, edema, headache, visual disturbances, epigastric or URQ pain, abdominal pain, rupture of membranes.  Contractions continue to get stronger as the night progressed.  Support persons Scout present.  Action: Verbal consent for EFM. Triage assessment completed. EFM applied for fetal wellbeing during labor. Uterine assessment soft and non tender to touch.   Response: Dr. Enrique informed of but not limited to above information, prenatal history, FHT's, SVE, and contraction pattern. Plan per provider is admit to labor and delivery.  Pt may have epidural for labor pain. Patient verbalized agreement with plan. Patient transferred to room 233 ambulatory, oriented to room and call light.

## 2018-02-18 NOTE — IP AVS SNAPSHOT
[address]    [place] [address]    Phone:  [phone]                                       After Visit Summary   2/18/2018    Elvia Dickens    MRN: [id number]           After Visit Summary Signature Page     I have received my discharge instructions, and my questions have been answered. I have discussed any challenges I see with this plan with the nurse or doctor.    ..........................................................................................................................................  Patient/Patient Representative Signature      ..........................................................................................................................................  Patient Representative Print Name and Relationship to Patient    ..................................................               ................................................  Date                                            Time    ..........................................................................................................................................  Reviewed by Signature/Title    ...................................................              ..............................................  Date                                                            Time

## 2018-02-19 PROCEDURE — 25000132 ZZH RX MED GY IP 250 OP 250 PS 637: Performed by: OBSTETRICS & GYNECOLOGY

## 2018-02-19 PROCEDURE — 12000037 ZZH R&B POSTPARTUM INTERMEDIATE

## 2018-02-19 RX ADMIN — SENNOSIDES AND DOCUSATE SODIUM 1 TABLET: 8.6; 5 TABLET ORAL at 11:17

## 2018-02-19 RX ADMIN — IBUPROFEN 800 MG: 400 TABLET ORAL at 17:34

## 2018-02-19 RX ADMIN — IBUPROFEN 800 MG: 400 TABLET ORAL at 11:16

## 2018-02-19 RX ADMIN — SENNOSIDES AND DOCUSATE SODIUM 1 TABLET: 8.6; 5 TABLET ORAL at 17:34

## 2018-02-19 NOTE — LACTATION NOTE
Initial visit.   Breastfeeding general information given.  Advised to breastfeed exclusively, on demand, avoid pacifiers, bottles and formula unless medically indicated.  Encouraged rooming in, skin to skin, feeding on demand 8-12x/day or sooner if baby cues.  Explained benefits of holding and skin to skin.  Encouraged lots of skin to skin. Instructed on hand expression.   Continues to nurse well per mom. No further questions at this time.   Will follow as needed.   Lucila Lomeli RNC, IBCLC

## 2018-02-19 NOTE — PLAN OF CARE
Problem: Patient Care Overview  Goal: Plan of Care/Patient Progress Review  Outcome: No Change  Vital signs stable. Fundus firm, scant flow. Working on breastfeeding with infant every 2-3 hours, on demand feedings encouraged. Pain managed with tylenol & ibuprofen. Voiding adequately, IV removed. Patient gaining independence with self and  cares. Questions and concerns addressed. Will continue to monitor.

## 2018-02-19 NOTE — LACTATION NOTE
Routine visit. Asked to see -regarding questions about concerns over milk supply and latching baby on the left side.  Baby is less than 24 hours old when seen today.  He latched and suckled well soon after birth, but has been sleepy most of the time since.  Currently he is latched on the left breast, suckles 2-3 times and fatigues quickly. We reviewed general breastfeeding information.  Explained how milk supply is established and maintained.  Showed how to position baby so that he is able to latch deeply.    Showed parents how to identify and correct a poor latch.    Encouraged frequent ad lynda feedings to equal 8-12 feedings/24 hours and fill out feeding log to keep track of number of times fed.  Will continue to support and follow closely.  No further questions at this time. Lucila Lomeli RNC, IBCLC

## 2018-02-19 NOTE — PLAN OF CARE
Problem: Patient Care Overview  Goal: Plan of Care/Patient Progress Review  Outcome: Improving  Fundus firm with no free flow or clots.  Voiding without difficulty.  Taking occasional Ibuprofen for discomfort.  Working on breastfeeding.  Doing well with baby cares.

## 2018-02-19 NOTE — PLAN OF CARE
Problem: Patient Care Overview  Goal: Plan of Care/Patient Progress Review  Outcome: No Change  [place] to pp routines. Vss. Lucille reg diet. Void x 2 in bathroom & staedy on feet.  Ibuprofen & Tylenol providing adequate pain control for rectal discomfort for hemorrhoid which she had pre-admit. . Using ice&  hydrocortisone cream. Declines tucks. Breast feeding baby.

## 2018-02-19 NOTE — PROGRESS NOTES
"Elvia Dickens  2018  PPD#1 s/p     S: Pt doing well with no acute events overnight.  Pain well controlled;  ambulating without difficulty; tolerating po intake; passing flatus.  Denies SOB, CP, HA, nausea/vomiting, fevers/chills.  Decreased lochia.  Breastfeeding without difficulty.  She desires circumcision for her son - this will be done tomorrow morning.    O: /54  Pulse 92  Temp 98  F (36.7  C) (Oral)  Resp 16  Ht 1.575 m (5' 2\")  Wt 72.6 kg (160 lb)  LMP 2017  SpO2 99%  Breastfeeding? Unknown  BMI 29.26 kg/m2    Recent Labs  Lab 18  0400   HGB 9.9*     Abdomen: soft, non tender, fundus firm 2 cm below the umbilicus  Ext: non tender, no edema or erythema    A/P: s/p  PPD #1 - doing well  Continue routine post partum care  Discussed contraceptive options, which will be readdressed at 6 week post-partum appointment.  Discharge planning for tomorrow, following circumcision for infant.    Alexandra Martinez MD    "

## 2018-02-19 NOTE — ANESTHESIA POSTPROCEDURE EVALUATION
Patient: Elvia Dickens    * No procedures listed *    Diagnosis:* No pre-op diagnosis entered *  Diagnosis Additional Information: No value filed.    Anesthesia Type:  No value filed.    Note:  Anesthesia Post Evaluation    Patient location during evaluation: Floor  Patient participation: Able to fully participate in evaluation  Level of consciousness: awake  Pain management: adequate  Airway patency: patent  Cardiovascular status: acceptable  Respiratory status: acceptable  Hydration status: acceptable  PONV: none     Anesthetic complications: None    Comments: Uncomplicated DEB        Last vitals:  Vitals:    02/19/18 0247 02/19/18 0900 02/19/18 1500   BP: 100/54 94/64 114/64   Pulse: 92 68    Resp: 16 16 16   Temp: 36.7  C (98  F) 36.7  C (98  F) 36.7  C (98  F)   SpO2:            Electronically Signed By: Kathia Castillo MD  February 19, 2018  4:09 PM

## 2018-02-20 VITALS
TEMPERATURE: 98.5 F | DIASTOLIC BLOOD PRESSURE: 66 MMHG | RESPIRATION RATE: 16 BRPM | HEIGHT: 62 IN | WEIGHT: 160 LBS | OXYGEN SATURATION: 99 % | SYSTOLIC BLOOD PRESSURE: 114 MMHG | HEART RATE: 117 BPM | BODY MASS INDEX: 29.44 KG/M2

## 2018-02-20 LAB — COPATH REPORT: NORMAL

## 2018-02-20 PROCEDURE — 25000132 ZZH RX MED GY IP 250 OP 250 PS 637: Performed by: OBSTETRICS & GYNECOLOGY

## 2018-02-20 RX ORDER — IBUPROFEN 600 MG/1
600 TABLET, FILM COATED ORAL EVERY 6 HOURS PRN
Qty: 90 TABLET | Refills: 1 | Status: SHIPPED | OUTPATIENT
Start: 2018-02-20 | End: 2018-05-18

## 2018-02-20 RX ADMIN — IBUPROFEN 800 MG: 400 TABLET ORAL at 01:37

## 2018-02-20 RX ADMIN — ACETAMINOPHEN 650 MG: 325 TABLET, FILM COATED ORAL at 09:39

## 2018-02-20 RX ADMIN — SENNOSIDES AND DOCUSATE SODIUM 1 TABLET: 8.6; 5 TABLET ORAL at 09:39

## 2018-02-20 RX ADMIN — IBUPROFEN 800 MG: 400 TABLET ORAL at 09:39

## 2018-02-20 NOTE — PLAN OF CARE
Problem: Patient Care Overview  Goal: Plan of Care/Patient Progress Review  Outcome: Adequate for Discharge Date Met: 02/20/18  Up and tolerating activity well.  Fundus is firm with scant flow.  Voiding without difficulty.  Taking Ibuprofen and Tylenol for discomfort.  Using hydrocortisone cream on hemorrhoids.  Doing well with baby cares and feedings.  Looking forward to discharge today.

## 2018-02-20 NOTE — LACTATION NOTE
Follow up visit.  Infant attempting to feed at time of visit but sleepy.  Assisted with waking baby and helping Elvia hold her breast better to help infant latch.  Infant latched well when she supported her breast.  Reviewed signs of a good latch.  Infant had occasional swallowing.  Reviewed outpatient lactation resources for use upon discharge.  Elvia had no questions or concerns.    Marily Colon RN, IBCLC

## 2018-02-20 NOTE — PROGRESS NOTES
"Elvia Maninder  2018  PPD#2 s/p     S: Pt doing well with no acute events overnight.  Pain well controlled;  ambulating without difficulty; tolerating po intake; passing flatus.  Denies SOB, CP, HA, nausea/vomiting, fevers/chills.  Decreased lochia.  Breast/Bottle feeding without difficulty.    O: /66 (BP Location: Right arm)  Pulse 117  Temp 98.5  F (36.9  C) (Axillary)  Resp 16  Ht 1.575 m (5' 2\")  Wt 72.6 kg (160 lb)  LMP 2017  SpO2 99%  Breastfeeding? Unknown  BMI 29.26 kg/m2    Recent Labs  Lab 18  0400   HGB 9.9*     Abdomen: soft, non tender, fundus firm 2 cm below the umbilicus  Ext: non tender, no edema or erythema    A/P: s/p  PPD #2 - doing well  Continue routine post partum care  Discussed contraceptive options, which will be readdressed at 6 week post-partum appointment.  Discharge planning for today, pending circumcision.    Alexandra Martinez MD    "

## 2018-02-20 NOTE — PLAN OF CARE
Problem: Patient Care Overview  Goal: Plan of Care/Patient Progress Review  Outcome: Improving  Vital signs stable. Fundus firm, scant flow. Baby breastfeeding well every 2-3 hours, cluster feeding overnight. Taking ibuprofen prn for cramping. Patient independent with self and  cares.  supportive at bedside. Questions and concerns addressed. Will continue to monitor.

## 2018-02-20 NOTE — DISCHARGE INSTRUCTIONS
Postpartum Vaginal Delivery Instructions    Activity       Ask family and friends for help when you need it.    Do not place anything in your vagina for 6 weeks.    You are not restricted on other activities, but take it easy for a few weeks to allow your body to recover from delivery.  You are able to do any activities you feel up to that point.    No driving until you have stopped taking your pain medications (usually two weeks after delivery).     Call your health care provider if you have any of these symptoms:       Increased pain, swelling, redness, or fluid around your stiches from an episiotomy or perineal tear.    A fever above 100.4 F (38 C) with or without chills when placing a thermometer under your tongue.    You soak a sanitary pad with blood within 1 hour, or you see blood clots larger than a golf ball.    Bleeding that lasts more than 6 weeks.    Vaginal discharge that smells bad.    Severe pain, cramping or tenderness in your lower belly area.    A need to urinate more frequently (use the toilet more often), more urgently (use the toilet very quickly), or it burns when you urinate.    Nausea and vomiting.    Redness, swelling or pain around a vein in your leg.    Problems breastfeeding or a red or painful area on your breast.    Chest pain and cough or are gasping for air.    Problems coping with sadness, anxiety, or depression.  If you have any concerns about hurting yourself or the baby, call your provider immediately.     You have questions or concerns after you return home.     Keep your hands clean:  Always wash your hands before touching your perineal area and stitches.  This helps reduce your risk of infection.  If your hands aren't dirty, you may use an alcohol hand-rub to clean your hands. Keep your nails clean and short.    Discharge Instructions    You should set up an appointment to see your doctor in 6 weeks after delivery for a postpartum exam.   You should also call if you develop any  heavy vaginal bleeding worse than a menstrual period, or fever over 100.5 degrees, or vomiting or increased pain.    You should abstain from intercourse for six weeks after delivery. You should avoid a bath for 1 week after delivery but showering is ok. If you have any questions about yourself or the baby, feel free to call or if any urgent concerns after hours, please go to the emergency room.    Alexandra Martinez MD

## 2018-02-20 NOTE — PLAN OF CARE
Problem: Patient Care Overview  Goal: Plan of Care/Patient Progress Review  Outcome: Improving  VSS.  Pain well controlled, requesting prn pain medications as needed.  Up independently in room.  Working on breastfeeding  and  cares, gaining more independence. On pathway. Continue to monitor and notify MD as needed.

## 2018-05-18 ENCOUNTER — OFFICE VISIT (OUTPATIENT)
Dept: FAMILY MEDICINE | Facility: CLINIC | Age: 28
End: 2018-05-18
Payer: COMMERCIAL

## 2018-05-18 VITALS
HEART RATE: 106 BPM | HEIGHT: 62 IN | TEMPERATURE: 99.3 F | SYSTOLIC BLOOD PRESSURE: 90 MMHG | RESPIRATION RATE: 18 BRPM | OXYGEN SATURATION: 95 % | BODY MASS INDEX: 22.19 KG/M2 | DIASTOLIC BLOOD PRESSURE: 62 MMHG | WEIGHT: 120.6 LBS

## 2018-05-18 DIAGNOSIS — J02.9 SORE THROAT: Primary | ICD-10-CM

## 2018-05-18 LAB
DEPRECATED S PYO AG THROAT QL EIA: NORMAL
SPECIMEN SOURCE: NORMAL

## 2018-05-18 PROCEDURE — 99213 OFFICE O/P EST LOW 20 MIN: CPT | Performed by: NURSE PRACTITIONER

## 2018-05-18 PROCEDURE — 87081 CULTURE SCREEN ONLY: CPT | Performed by: NURSE PRACTITIONER

## 2018-05-18 PROCEDURE — 87880 STREP A ASSAY W/OPTIC: CPT | Performed by: NURSE PRACTITIONER

## 2018-05-18 ASSESSMENT — PAIN SCALES - GENERAL: PAINLEVEL: MODERATE PAIN (5)

## 2018-05-18 NOTE — MR AVS SNAPSHOT
"              After Visit Summary   2018    Elvia Dickens    MRN: [id number]           Patient Information     Date Of Birth          1990        Visit Information        Provider Department      2018 3:40 PM Laura Dover NP [facility]        Today's Diagnoses     Sore throat    -  1       Follow-ups after your visit        Who to contact     If you have questions or need follow up information about today's clinic visit or your schedule please contact [facility] directly at [phone].  Normal or non-critical lab and imaging results will be communicated to you by MyChart, letter or phone within 4 business days after the clinic has received the results. If you do not hear from us within 7 days, please contact the clinic through [employer] or phone. If you have a critical or abnormal lab result, we will notify you by phone as soon as possible.  Submit refill requests through [employer] or call your pharmacy and they will forward the refill request to us. Please allow 3 business days for your refill to be completed.          Additional Information About Your Visit        MyChart Information     [employer] lets you send messages to your doctor, view your test results, renew your prescriptions, schedule appointments and more. To sign up, go to [email or web address] . Click on \"Log in\" on the left side of the screen, which will take you to the Welcome page. Then click on \"Sign up Now\" on the right side of the page.     You will be asked to enter the access code listed below, as well as some personal information. Please follow the directions to create your username and password.     Your access code is: [email or web address]  Expires: 2018  3:08 PM     Your access code will  in 90 days. If you need help or a new code, please call your [facility] or [phone].        Care EveryWhere ID     This is your Care EveryWhere ID. This could be used by other organizations to " "access your [place] medical records  [id number]        Your Vitals Were     Pulse Temperature Respirations Height Last Period Pulse Oximetry    106 99.3  F (37.4  C) (Oral) 18 1.575 m (5' 2\") 05/09/2018 95%    BMI (Body Mass Index)                   22.06 kg/m2            Blood Pressure from Last 3 Encounters:   05/18/18 90/62   02/20/18 114/66   02/18/18 109/70    Weight from Last 3 Encounters:   05/18/18 54.7 kg (120 lb 9.6 oz)   02/18/18 72.6 kg (160 lb)   06/22/16 48.5 kg (107 lb)              We Performed the Following     Beta strep group A culture     Strep, Rapid Screen        Primary Care Provider Office Phone # Fax #    Ary Sandhu [phone]        [facility] [address]  [place] [address]        Equal Access to Services     [facility]: Hadii aad ku hadasho Soomaali, waaxda luqadaha, qaybta kaalmada adeegyada, waxay idiin hayaan tony farris . So [facility] [phone].    ATENCIÓN: Si shantala melida, tiene a [place] disposición servicios gratuitos de asistencia lingüística. Llame al [phone].    We comply with applicable federal civil rights laws and [place] laws. We do not discriminate on the basis of race, color, national origin, age, disability, sex, sexual orientation, or gender identity.            Thank you!     Thank you for choosing [facility]  for your care. Our goal is always to provide you with excellent care. Hearing back from our patients is one way we can continue to improve our services. Please take a few minutes to complete the written survey that you may receive in the mail after your visit with us. Thank you!             Your Updated Medication List - Protect others around you: Learn how to safely use, store and throw away your medicines at www.disposemymeds.org.      Notice  As of 5/18/2018 11:59 PM    You have not been prescribed any medications.      "

## 2018-05-18 NOTE — PROGRESS NOTES
"  SUBJECTIVE:   Elvia Dickens is a 27 year old female who presents to clinic today for the following health issues:      Acute Illness   Acute illness concerns: Sore throat  Onset: Wednesday    Fever: YES    Chills/Sweats: YES    Headache (location?): no    Sinus Pressure:YES    Conjunctivitis:  no    Ear Pain: YES: bilateral    Rhinorrhea: no    Congestion: no    Sore Throat: YES     Cough: no    Wheeze: no    Decreased Appetite: yes    Nausea: no    Vomiting: no    Diarrhea:  no    Dysuria/Freq.: no    Fatigue/Achiness: no    Sick/Strep Exposure: no     Therapies Tried and outcome: dayquil/nyquil which is helping with the high temp    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       Problem list and histories reviewed & adjusted, as indicated.  Additional history: as documented    Patient Active Problem List   Diagnosis     Labor and delivery, indication for care     Indication for care in labor or delivery      (normal spontaneous vaginal delivery)     History reviewed. No pertinent surgical history.    Social History   Substance Use Topics     Smoking status: Never Smoker     Smokeless tobacco: Never Used     Alcohol use No     History reviewed. No pertinent family history.        Reviewed and updated as needed this visit by clinical staff  Tobacco  Allergies  Meds  Med Hx  Surg Hx  Fam Hx  Soc Hx      Reviewed and updated as needed this visit by Provider         ROS:  Constitutional, HEENT-as above, cardiovascular, pulmonary, gi and gu systems are negative, except as otherwise noted.    OBJECTIVE:     BP 90/62 (BP Location: Right arm, Patient Position: Sitting, Cuff Size: Adult Regular)  Pulse 106  Temp 99.3  F (37.4  C) (Oral)  Resp 18  Ht 1.575 m (5' 2\")  Wt 54.7 kg (120 lb 9.6 oz)  LMP 2018  SpO2 95%  BMI 22.06 kg/m2  Body mass index is 22.06 kg/(m^2).  GENERAL: healthy, alert and no distress  EYES: Eyes grossly normal to inspection, PERRL and conjunctivae and sclerae normal  HENT: ear " canals and TM's normal, nose and mouth without ulcers or lesions. Posterior pharynx erythema, swollen, red, slight exudate present  NECK: no adenopathy, no asymmetry, masses, or scars and thyroid normal to palpation  RESP: lungs clear to auscultation - no rales, rhonchi or wheezes  CV: regular rate and rhythm, normal S1 S2, no S3 or S4, no murmur, click or rub    Diagnostic Test Results:  No results found for this or any previous visit (from the past 24 hour(s)).    ASSESSMENT/PLAN:     1. Sore throat  Neg strep. Continue supportive treatment. If not improving return  - Strep, Rapid Screen  - Beta strep group A culture    FUTURE APPOINTMENTS:       - Follow-up visit prn not improving    RONI Vidal, NP-C  [facility]

## 2018-05-18 NOTE — Clinical Note
Please abstract the following data from this visit with this patient into the appropriate field in Epic:  Pap smear done on this date: 11/2017 (approximately), by this group: Dr. Humphries, results were normal.

## 2018-05-18 NOTE — LETTER
[address]  [address]  [phone]    May 21, 2018      Elvia Dickens  [address] [facility] [address]          MsEric Maninder,     Negative strep culture results.   Please contact the clinic if you have additional questions.  Thank you.     Sincerely,     RONI Vidal, NP-C

## 2018-05-19 LAB
BACTERIA SPEC CULT: NORMAL
SPECIMEN SOURCE: NORMAL

## 2019-07-10 ENCOUNTER — VIRTUAL VISIT (OUTPATIENT)
Dept: FAMILY MEDICINE | Facility: OTHER | Age: 29
End: 2019-07-10

## 2019-07-10 NOTE — PROGRESS NOTES
"Date:   Clinician: Radha Floyd  Clinician NPI: [id number]  Patient: Elvia Dickens  Patient : 1990  Patient Address: [address] [place] [address] [address] [place], [place] [address]  Patient Phone: [phone]  Visit Protocol: UTI  Patient Summary:  Elvia is a 29 year old ( : 1990 ) female who initiated a Visit for a presumed bladder infection. When asked the question \"Please sign me up to receive news, health information and promotions from [employer].\", Elvia responded \"No\".   Her symptoms started 1-3 days ago and consist of urinary frequency, urgency, urinary incontinence, dysuria, foul-smelling urine, feeling as if the bladder is never empty, and abdominal pain.   Symptom details     Urine color: The color of her urine is yellow.     Abdominal pain: The pain is moderate (4-6 on a 10 point pain scale).      Denied symptoms include chills, vaginal discharge, vomiting, vaginal itching, nausea, and flank pain. She does not feel feverish.   Over-the-counter medications or home remedies used to relieve the current symptoms as reported by the patient (free text): Azo   Precipitating events  Elvia denies having a sexually transmitted disease.  Pertinent medical history  Elvia does not get yeast infections when she takes antibiotics. She has not experienced problems or side effects with any of the common antibiotics used to treat bladder infections.   Elvia does not have a history of bladder infections or kidney stones. She has not used a catheter or been a patient in a hospital or nursing home in the past 2 weeks.   Elvia does not smoke or use smokeless tobacco.   She denies pregnancy and denies breastfeeding. She has menstruated in the past month.   Additional information as reported by the patient (free text): I have had some blood in my urine and 1 or 2 pencil eraser size clots.     MEDICATIONS: No current medications, ALLERGIES: NKDA  Clinician Response:  Dear Elvia,  Based on the information " you have provided, you likely have an acute urinary tract infection, also called a bladder infection. Bladder infections occur when bacteria from the outside of the body enters the urinary tract. Any part of the urinary system can be infected, but the bladder is the most common.  Medication information  I am prescribing:     Sulfamethoxazole-trimethoprim (Bactrim DS) 800-160 mg oral tablet. Take 1 tablet by mouth every 12 hours for 3 days. There are no refills with this prescription.   Certain antibiotics such as Bactrim and Ciprofloxacin can cause your skin to be more sensitive to the sun. Exposure to the sun when taking these antibiotics may cause skin rash, itching, redness, or a severe sunburn. Be sure to avoid prolonged or direct exposure to the sun, especially between 10 am and 3 pm, if possible. Wear protective clothing and use sunscreen of SPF 30 or higher when you are outdoors.  The medication I prescribed for your bladder infection is an antibiotic. Continue taking the medication until it is gone even if you feel better. If you get an upset stomach while taking antibiotics, taking the medication with food can help.   Yeast infections can be a common side effect of antibiotics. The most common symptom of a yeast infection is itchiness in and around the vagina. Other signs and symptoms include burning, redness, or a thick, white vaginal discharge that looks like cottage cheese and does not have a bad smell.  Self care  Urination helps to flush bacteria from the urinary tract. For this reason, drinking water and urinating often helps relieve some urinary symptoms and can decrease your risk of getting bladder infections in the future.  Other steps you can take to prevent future bladder infections include:     Wipe front to back after using the bathroom    Urinate after sexual intercourse    Avoid using deodorant sprays, douches, or powders in the vaginal area     When to seek care  Please make an appointment  to be seen in a clinic or urgent care if any of the following occur:     You develop new symptoms or your symptoms become worse    You have medication side effects that make it difficult to take them as prescribed    Your symptoms do not improve within 1-2 days of starting treatment    You have symptoms of a bladder infection that return shortly after completing treatment     It is possible to have an allergic reaction to an antibiotic even if you have not had one in the past. If you notice a new rash, significant swelling, or difficulty breathing, stop taking this medication immediately and go to a clinic or urgent care.   Diagnosis: Acute uncomplicated bladder infection  Diagnosis ICD: N39.0  Prescription: sulfamethoxazole-trimethoprim (Bactrim DS) 800-160 mg oral tablet 6 tablet, 3 days supply. Take 1 tablet by mouth every 12 hours for 3 days. Refills: 0, Refill as needed: no, Allow substitutions: yes  Pharmacy: [id number] - [phone] - [address] [phone]

## 2019-07-14 ENCOUNTER — VIRTUAL VISIT (OUTPATIENT)
Dept: FAMILY MEDICINE | Facility: OTHER | Age: 29
End: 2019-07-14

## 2019-07-14 NOTE — PROGRESS NOTES
"Date:   Clinician: Shannan Apple  Clinician NPI: [id number]  Patient: Elvia Dickens  Patient : 1990  Patient Address: [address] [place] [address], [place] [address]  Patient Phone: [phone]  Visit Protocol: UTI  Patient Summary:  Elvia is a 29 year old ( : 1990 ) female who initiated a Visit for a presumed bladder infection. When asked the question \"Please sign me up to receive news, health information and promotions from [employer].\", Elvia responded \"No\".   Her symptoms started 4-6 days ago and consist of urinary frequency, chills, urgency, urinary incontinence, dysuria, foul-smelling urine, nausea, and abdominal pain.   Symptom details     Urine color: The color of her urine is yellow.     Abdominal pain: The pain is moderate (4-6 on a 10 point pain scale).      Denied symptoms include vaginal discharge, vomiting, vaginal itching, feeling as if the bladder is never empty, and flank pain. She does not feel feverish.   Elvia has not used any over-the-counter medications or home remedies to relieve her current symptoms.  Precipitating events  Elvia denies having a sexually transmitted disease.  Pertinent medical history  Elvia does not get yeast infections when she takes antibiotics. She has not experienced problems or side effects with any of the common antibiotics used to treat bladder infections.   Elvia does not have a history of bladder infections or kidney stones. She has not used a catheter or been a patient in a hospital or nursing home in the past 2 weeks.   Elvia does not smoke or use smokeless tobacco.   She denies pregnancy and denies breastfeeding. She has menstruated in the past month.   Additional information as reported by the patient (free text): I was taking Sulfameth/Trimethoprim for 3 days, but I finished taking it on Friday, the  and my infection is present.     MEDICATIONS: No current medications, ALLERGIES: NKDA  Clinician Response:  Dear NEIL Gan " am sorry you are not feeling well. To determine the most appropriate care for you, I would like you to be seen in person to further discuss your health history and symptoms.  You will not be charged for this Visit. Thank you for trusting us with your care.   Diagnosis: Refer for additional evaluation  Diagnosis ICD: R69  Additional Clinician Notes: Corona Gan, I'm sorry you're not feeling well.  You should be seen in urgent care, so they can do a culture and find the right antibiotic to treat the bacteria you have.  Also, you filled out a previous interview saying you have a high fever.  Please be seen in person.  Hope you feel better soon!   No

## 2019-07-15 ENCOUNTER — OFFICE VISIT (OUTPATIENT)
Dept: FAMILY MEDICINE | Facility: CLINIC | Age: 29
End: 2019-07-15
Payer: COMMERCIAL

## 2019-07-15 VITALS
HEIGHT: 62 IN | OXYGEN SATURATION: 95 % | WEIGHT: 111 LBS | BODY MASS INDEX: 20.43 KG/M2 | TEMPERATURE: 99.3 F | HEART RATE: 76 BPM | SYSTOLIC BLOOD PRESSURE: 106 MMHG | DIASTOLIC BLOOD PRESSURE: 64 MMHG

## 2019-07-15 DIAGNOSIS — R30.0 DYSURIA: ICD-10-CM

## 2019-07-15 DIAGNOSIS — R10.2 VAGINAL PAIN: Primary | ICD-10-CM

## 2019-07-15 DIAGNOSIS — N30.00 ACUTE CYSTITIS WITHOUT HEMATURIA: ICD-10-CM

## 2019-07-15 LAB
RBC #/AREA URNS AUTO: ABNORMAL /HPF
SPECIMEN SOURCE: NORMAL
WBC #/AREA URNS AUTO: >100 /HPF
WET PREP SPEC: NORMAL

## 2019-07-15 PROCEDURE — 81015 MICROSCOPIC EXAM OF URINE: CPT | Performed by: FAMILY MEDICINE

## 2019-07-15 PROCEDURE — 87210 SMEAR WET MOUNT SALINE/INK: CPT | Performed by: FAMILY MEDICINE

## 2019-07-15 PROCEDURE — 87086 URINE CULTURE/COLONY COUNT: CPT | Performed by: FAMILY MEDICINE

## 2019-07-15 PROCEDURE — 87088 URINE BACTERIA CULTURE: CPT | Performed by: FAMILY MEDICINE

## 2019-07-15 PROCEDURE — 87186 SC STD MICRODIL/AGAR DIL: CPT | Performed by: FAMILY MEDICINE

## 2019-07-15 PROCEDURE — 99214 OFFICE O/P EST MOD 30 MIN: CPT | Performed by: FAMILY MEDICINE

## 2019-07-15 RX ORDER — VITAMIN A ACETATE, .BETA.-CAROTENE, ASCORBIC ACID, CHOLECALCIFEROL, .ALPHA.-TOCOPHEROL ACETATE, DL-, THIAMINE MONONITRATE, RIBOFLAVIN, NIACINAMIDE, PYRIDOXINE HYDROCHLORIDE, FOLIC ACID, CYANOCOBALAMIN, CALCIUM CARBONATE, FERROUS FUMARATE, ZINC OXIDE, AND CUPRIC OXIDE 2000; 2000; 120; 400; 22; 1.84; 3; 20; 10; 1; 12; 200; 27; 25; 2 [IU]/1; [IU]/1; MG/1; [IU]/1; MG/1; MG/1; MG/1; MG/1; MG/1; MG/1; UG/1; MG/1; MG/1; MG/1; MG/1
1 TABLET ORAL DAILY
COMMUNITY

## 2019-07-15 RX ORDER — CIPROFLOXACIN 250 MG/1
250 TABLET, FILM COATED ORAL 2 TIMES DAILY
Qty: 14 TABLET | Refills: 0 | Status: SHIPPED | OUTPATIENT
Start: 2019-07-15 | End: 2019-09-10

## 2019-07-15 ASSESSMENT — MIFFLIN-ST. JEOR: SCORE: 1191.74

## 2019-07-15 NOTE — PROGRESS NOTES
"Subjective     Elvia Dickens is a 29 year old female who presents to clinic today for the following health issues:    HPI   URINARY TRACT SYMPTOMS  Onset: Over 1 week    Description:   Painful urination (Dysuria): YES initially  Blood in urine (Hematuria): YES- blood and clots outside of menstrual cycle but no longer bleeding - occurred the next AM after burning symptoms.  Delay in urine (Hesitency): YES    Intensity: moderate    Progression of Symptoms:  Was getting better with Virtual visit but now has reverted     Accompanying Signs & Symptoms:  Fever/chills: YES- fever on 7/12/19  Flank pain YES- right side   Nausea and vomiting: YES- nausea   Any vaginal symptoms: vaginal discharge, vaginal odor and abnormal vaginal bleeding  Abdominal/Pelvic Pain: no     History:   History of frequent UTI's: YES- 10 years ago   History of kidney stones: no   Sexually Active: YES  Possibility of pregnancy: No    Precipitating factors:   None     Therapies Tried and outcome: Cranberry juice prn (contraindicated in Coumadin patients), prenatals and Sulfameth 800/160MG TB - took from 7/10 - 7/12          BP Readings from Last 3 Encounters:   07/15/19 106/64   05/18/18 90/62   02/20/18 114/66    Wt Readings from Last 3 Encounters:   07/15/19 50.3 kg (111 lb)   05/18/18 54.7 kg (120 lb 9.6 oz)   02/18/18 72.6 kg (160 lb)                      Reviewed and updated as needed this visit by Provider  Tobacco  Allergies  Meds  Med Hx  Surg Hx  Fam Hx  Soc Hx        Review of Systems   ROS COMP: Constitutional, HEENT, cardiovascular, pulmonary, gi and gu systems are negative, except as otherwise noted.      Objective    /64 (BP Location: Right arm, Patient Position: Chair, Cuff Size: Adult Regular)   Pulse 76   Temp 99.3  F (37.4  C) (Oral)   Ht 1.575 m (5' 2\")   Wt 50.3 kg (111 lb)   LMP 06/25/2019 (Approximate)   SpO2 95%   Breastfeeding? No   BMI 20.30 kg/m    Body mass index is 20.3 kg/m .  Physical Exam   GENERAL: " healthy, alert and no distress  NECK: no adenopathy, no asymmetry, masses, or scars and thyroid normal to palpation  RESP: lungs clear to auscultation - no rales, rhonchi or wheezes  CV: regular rate and rhythm, normal S1 S2, no S3 or S4, no murmur, click or rub, no peripheral edema and peripheral pulses strong  ABDOMEN: soft, nontender, no hepatosplenomegaly, no masses and bowel sounds normal  MS: no gross musculoskeletal defects noted, no edema  BACK: no CVA tenderness, no paralumbar tenderness    Diagnostic Test Results:  Labs reviewed in Epic  Results for orders placed or performed in visit on 07/15/19   Urine Microscopic   Result Value Ref Range    WBC Urine >100 (A) OTO5^0 - 5 /HPF    RBC Urine O - 2 OTO2^O - 2 /HPF   Wet prep   Result Value Ref Range    Specimen Description Vagina     Wet Prep No Trichomonas seen     Wet Prep No clue cells seen     Wet Prep No yeast seen     Wet Prep Few  WBC'S seen            Assessment & Plan     1. Vaginal pain  Negative wet prep.  - Wet prep    2. Dysuria  Probable untreated UTI with last meds.    - Urine Culture Aerobic Bacterial  - Urine Microscopic    3. Acute cystitis without hematuria  - ciprofloxacin (CIPRO) 250 MG tablet; Take 1 tablet (250 mg) by mouth 2 times daily  Dispense: 14 tablet; Refill: 0       Patient Instructions   Begin Cipro 250 mg twice a day for 7 days.    I will send the final culture result when available.    IF fever, vomiting, worsening back or abdominal pain occurs, follow up is needed.      Return in about 1 year (around 7/15/2020) for as needed for persistent symptoms.    Beryl James MD  [facility]

## 2019-07-15 NOTE — LETTER
[address]  [address]  [phone]    July 17, 2019      Elvia Dickens  [address] [facility] [address]      Dear Elvia,          Attached you will find a copy of your recent laboratory report.   Your urine culture does show infection but it is sensitive to the antibiotic given at your appointment.  It was resistant to the bactrim you were initially given which is why the symptoms did not improved after the first course of medication.   Please call or MyChart message me if you have any questions.       Sincerely,         Beryl James MD

## 2019-07-16 NOTE — PATIENT INSTRUCTIONS
Begin Cipro 250 mg twice a day for 7 days.    I will send the final culture result when available.    IF fever, vomiting, worsening back or abdominal pain occurs, follow up is needed.

## 2019-07-17 LAB
BACTERIA SPEC CULT: ABNORMAL
SPECIMEN SOURCE: ABNORMAL

## 2019-07-17 NOTE — RESULT ENCOUNTER NOTE
Please print letter and attach results.  PSK        Attached you will find a copy of your recent laboratory report.  Your urine culture does show infection but it is sensitive to the antibiotic given at your appointment.  It was resistant to the bactrim you were initially given which is why the symptoms did not improved after the first course of medication.  Please call or MyChart message me if you have any questions.    Sincerely,         Beryl James MD

## 2019-09-10 ENCOUNTER — OFFICE VISIT (OUTPATIENT)
Dept: FAMILY MEDICINE | Facility: CLINIC | Age: 29
End: 2019-09-10
Payer: COMMERCIAL

## 2019-09-10 VITALS
HEIGHT: 62 IN | TEMPERATURE: 99.8 F | OXYGEN SATURATION: 94 % | DIASTOLIC BLOOD PRESSURE: 77 MMHG | WEIGHT: 111 LBS | RESPIRATION RATE: 18 BRPM | SYSTOLIC BLOOD PRESSURE: 118 MMHG | HEART RATE: 124 BPM | BODY MASS INDEX: 20.43 KG/M2

## 2019-09-10 DIAGNOSIS — J20.9 ACUTE BRONCHITIS, UNSPECIFIED ORGANISM: Primary | ICD-10-CM

## 2019-09-10 PROCEDURE — 99213 OFFICE O/P EST LOW 20 MIN: CPT | Performed by: PHYSICIAN ASSISTANT

## 2019-09-10 ASSESSMENT — PAIN SCALES - GENERAL: PAINLEVEL: MODERATE PAIN (4)

## 2019-09-10 ASSESSMENT — MIFFLIN-ST. JEOR: SCORE: 1191.74

## 2019-09-10 NOTE — PATIENT INSTRUCTIONS
Take augmentin twice a day for 10 days  Follow up with us if no improvement in symptoms over the next 3-5 days  Return urgently if any change in symptoms like increasing cough, shortness of breath or other change in symptoms.

## 2019-09-10 NOTE — PROGRESS NOTES
Subjective     Elvia Dickens is a 29 year old female who presents to clinic today for the following health issues:    HPI   Acute Illness   Acute illness concerns: cough  Onset: 1 1/2 weeks    Fever: YES- 102-105    Chills/Sweats: YES    Headache (location?): no    Sinus Pressure:no    Conjunctivitis:  no    Ear Pain: no    Rhinorrhea: YES    Congestion: YES    Sore Throat: no     Cough: YES-productive of brown sputum    Wheeze: no    Decreased Appetite: YES    Nausea: YES    Vomiting: no    Diarrhea:  no    Dysuria/Freq.: no    Fatigue/Achiness: YES    Sick/Strep Exposure: no     Therapies Tried and outcome: dayquil and nyquil      Son and  were ill with similar symptoms except they did not have fever Son  1 1/2 year old- goes to - no fever but cough  Is Trying to conceive  Last had fever this am. Fever for 5 days.  And body aches.  Is a bit nauseated. No vomiting     Patient Active Problem List   Diagnosis     Labor and delivery, indication for care     Indication for care in labor or delivery      (normal spontaneous vaginal delivery)     Past Surgical History:   Procedure Laterality Date     NO HISTORY OF SURGERY         Social History     Tobacco Use     Smoking status: Never Smoker     Smokeless tobacco: Never Used   Substance Use Topics     Alcohol use: No     Alcohol/week: 0.0 oz     Family History   Problem Relation Age of Onset     No Known Problems Mother      No Known Problems Father      Diabetes No family hx of      Coronary Artery Disease No family hx of      Hypertension No family hx of      Colon Cancer No family hx of      Breast Cancer No family hx of          Current Outpatient Medications   Medication Sig Dispense Refill            Prenatal Vit-Fe Fumarate-FA (PNV PRENATAL PLUS MULTIVITAMIN) 27-1 MG TABS per tablet Take 1 tablet by mouth daily       BP Readings from Last 3 Encounters:   09/10/19 118/77   07/15/19 106/64   18 90/62    Wt Readings from Last 3 Encounters:  "  09/10/19 50.3 kg (111 lb)   07/15/19 50.3 kg (111 lb)   05/18/18 54.7 kg (120 lb 9.6 oz)                      Reviewed and updated as needed this visit by Provider  Tobacco  Allergies  Meds  Problems  Med Hx  Surg Hx  Fam Hx  Soc Hx          Review of Systems   ROS COMP: Constitutional, HEENT, cardiovascular, pulmonary, gi and gu systems are negative, except as otherwise noted.      Objective    /77 (BP Location: Right arm, Patient Position: Chair, Cuff Size: Adult Regular)   Pulse 124   Temp 99.8  F (37.7  C) (Oral)   Resp 18   Ht 1.575 m (5' 2\")   Wt 50.3 kg (111 lb)   LMP 08/20/2019 (Exact Date)   SpO2 94%   Breastfeeding? No   BMI 20.30 kg/m    Body mass index is 20.3 kg/m .  Physical Exam   GENERAL: healthy, alert and no distress  EYES: Eyes grossly normal to inspection, PERRL and conjunctivae and sclerae normal  HENT: ear canals and TM's normal, nose and mouth without ulcers or lesions  NECK: no adenopathy, no asymmetry, masses, or scars and thyroid normal to palpation  RESP: lungs clear to auscultation - no rales, rhonchi or wheezes  CV: regular rate and rhythm, normal S1 S2, no S3 or S4, no murmur, click or rub, no peripheral edema and peripheral pulses strong  MS: no gross musculoskeletal defects noted, no edema    Diagnostic Test Results:  none         Assessment & Plan     1. Acute bronchitis, unspecified organism  Symptoms over 10 days with fever. Follow up with us in 3 days if fever persists.     - amoxicillin-clavulanate (AUGMENTIN) 875-125 MG tablet; Take 1 tablet by mouth 2 times daily  Dispense: 20 tablet; Refill: 0       Patient Instructions   Take augmentin twice a day for 10 days  Follow up with us if no improvement in symptoms over the next 3-5 days  Return urgently if any change in symptoms like increasing cough, shortness of breath or other change in symptoms.        Return in about 5 days (around 9/15/2019), or if symptoms worsen or fail to improve.    Glory MEJIA" DEBBIE Naejra  [facility]

## 2019-11-25 ENCOUNTER — OFFICE VISIT (OUTPATIENT)
Dept: FAMILY MEDICINE | Facility: CLINIC | Age: 29
End: 2019-11-25
Payer: COMMERCIAL

## 2019-11-25 VITALS
TEMPERATURE: 98.1 F | RESPIRATION RATE: 20 BRPM | DIASTOLIC BLOOD PRESSURE: 81 MMHG | WEIGHT: 117 LBS | HEIGHT: 62 IN | OXYGEN SATURATION: 99 % | HEART RATE: 86 BPM | SYSTOLIC BLOOD PRESSURE: 121 MMHG | BODY MASS INDEX: 21.53 KG/M2

## 2019-11-25 DIAGNOSIS — R30.0 DYSURIA: Primary | ICD-10-CM

## 2019-11-25 DIAGNOSIS — R82.90 NONSPECIFIC FINDING ON EXAMINATION OF URINE: ICD-10-CM

## 2019-11-25 DIAGNOSIS — Z23 NEED FOR INFLUENZA VACCINATION: ICD-10-CM

## 2019-11-25 LAB
ALBUMIN UR-MCNC: NEGATIVE MG/DL
APPEARANCE UR: CLEAR
BACTERIA #/AREA URNS HPF: ABNORMAL /HPF
BILIRUB UR QL STRIP: NEGATIVE
COLOR UR AUTO: YELLOW
GLUCOSE UR STRIP-MCNC: NEGATIVE MG/DL
HCG UR QL: NEGATIVE
HGB UR QL STRIP: ABNORMAL
KETONES UR STRIP-MCNC: NEGATIVE MG/DL
LEUKOCYTE ESTERASE UR QL STRIP: ABNORMAL
NITRATE UR QL: NEGATIVE
NON-SQ EPI CELLS #/AREA URNS LPF: ABNORMAL /LPF
PH UR STRIP: 7 PH (ref 5–7)
RBC #/AREA URNS AUTO: ABNORMAL /HPF
SOURCE: ABNORMAL
SP GR UR STRIP: <=1.005 (ref 1–1.03)
UROBILINOGEN UR STRIP-ACNC: 0.2 EU/DL (ref 0.2–1)
WBC #/AREA URNS AUTO: ABNORMAL /HPF

## 2019-11-25 PROCEDURE — 87186 SC STD MICRODIL/AGAR DIL: CPT | Performed by: NURSE PRACTITIONER

## 2019-11-25 PROCEDURE — 99213 OFFICE O/P EST LOW 20 MIN: CPT | Mod: 25 | Performed by: NURSE PRACTITIONER

## 2019-11-25 PROCEDURE — 90471 IMMUNIZATION ADMIN: CPT | Performed by: NURSE PRACTITIONER

## 2019-11-25 PROCEDURE — 81025 URINE PREGNANCY TEST: CPT | Performed by: NURSE PRACTITIONER

## 2019-11-25 PROCEDURE — 87086 URINE CULTURE/COLONY COUNT: CPT | Performed by: NURSE PRACTITIONER

## 2019-11-25 PROCEDURE — 87088 URINE BACTERIA CULTURE: CPT | Performed by: NURSE PRACTITIONER

## 2019-11-25 PROCEDURE — 90686 IIV4 VACC NO PRSV 0.5 ML IM: CPT | Performed by: NURSE PRACTITIONER

## 2019-11-25 PROCEDURE — 81001 URINALYSIS AUTO W/SCOPE: CPT | Performed by: NURSE PRACTITIONER

## 2019-11-25 RX ORDER — CEPHALEXIN 500 MG/1
500 CAPSULE ORAL 2 TIMES DAILY
Qty: 14 CAPSULE | Refills: 0 | Status: SHIPPED | OUTPATIENT
Start: 2019-11-25 | End: 2020-02-04

## 2019-11-25 ASSESSMENT — PAIN SCALES - GENERAL: PAINLEVEL: SEVERE PAIN (6)

## 2019-11-25 ASSESSMENT — MIFFLIN-ST. JEOR: SCORE: 1218.96

## 2019-11-25 NOTE — PROGRESS NOTES
Subjective     Elvia Dickens is a 29 year old female who presents to clinic today for the following health issues:    HPI     URINARY TRACT SYMPTOMS  Onset: yesterday     Description:   Painful urination (Dysuria): YES           Frequency: YES  Blood in urine (Hematuria): no   Delay in urine (Hesitency): YES    Intensity: severe    Progression of Symptoms:  worsening    Accompanying Signs & Symptoms:  Fever/chills: no   Flank pain YES  Nausea and vomiting: no   Any vaginal symptoms: none  Abdominal/Pelvic Pain: YES    History:   History of frequent UTI's: YES  History of kidney stones: no   Sexually Active: YES  Possibility of pregnancy: yes     Precipitating factors:   None     Therapies Tried and outcome: lots of water     Patient's last menstrual period was 11/10/2019.  They are trying to conceive    Symptoms above feel the same as UTI in July.      Patient Active Problem List   Diagnosis     Labor and delivery, indication for care     Indication for care in labor or delivery      (normal spontaneous vaginal delivery)     Past Surgical History:   Procedure Laterality Date     NO HISTORY OF SURGERY         Social History     Tobacco Use     Smoking status: Never Smoker     Smokeless tobacco: Never Used   Substance Use Topics     Alcohol use: No     Alcohol/week: 0.0 standard drinks     Family History   Problem Relation Age of Onset     No Known Problems Mother      No Known Problems Father      Diabetes No family hx of      Coronary Artery Disease No family hx of      Hypertension No family hx of      Colon Cancer No family hx of      Breast Cancer No family hx of          Current Outpatient Medications   Medication Sig Dispense Refill     cephALEXin (KEFLEX) 500 MG capsule Take 1 capsule (500 mg) by mouth 2 times daily for 7 days 14 capsule 0     Prenatal Vit-Fe Fumarate-FA (PNV PRENATAL PLUS MULTIVITAMIN) 27-1 MG TABS per tablet Take 1 tablet by mouth daily       No Known Allergies  BP Readings from Last 3  "Encounters:   11/25/19 121/81   09/10/19 118/77   07/15/19 106/64    Wt Readings from Last 3 Encounters:   11/25/19 53.1 kg (117 lb)   09/10/19 50.3 kg (111 lb)   07/15/19 50.3 kg (111 lb)                      Reviewed and updated as needed this visit by Provider         Review of Systems   ROS COMP: Constitutional, HEENT, cardiovascular, pulmonary, gi and gu systems are negative, except as otherwise noted.      Objective    /81 (BP Location: Left arm, Patient Position: Chair, Cuff Size: Adult Regular)   Pulse 86   Temp 98.1  F (36.7  C) (Oral)   Resp 20   Ht 1.575 m (5' 2\")   Wt 53.1 kg (117 lb)   LMP 11/15/2019 (Exact Date)   SpO2 99%   Breastfeeding No   BMI 21.40 kg/m    Body mass index is 21.4 kg/m .  Physical Exam   GENERAL: healthy, alert and no distress  NECK: no adenopathy, no asymmetry, masses, or scars and thyroid normal to palpation  RESP: lungs clear to auscultation - no rales, rhonchi or wheezes  CV: regular rate and rhythm, normal S1 S2, no S3 or S4, no murmur, click or rub, no peripheral edema and peripheral pulses strong  ABDOMEN: soft, nontender, no hepatosplenomegaly, no masses and bowel sounds normal  MS: no gross musculoskeletal defects noted, no edema  BACK: no CVA tenderness, no paralumbar tenderness    Diagnostic Test Results:  Results for orders placed or performed in visit on 11/25/19 (from the past 24 hour(s))   *UA reflex to Microscopic and Culture ([place] and [facility] (except [place] and [place])   Result Value Ref Range    Color Urine Yellow     Appearance Urine Clear     Glucose Urine Negative NEG^Negative mg/dL    Bilirubin Urine Negative NEG^Negative    Ketones Urine Negative NEG^Negative mg/dL    Specific Gravity Urine <=1.005 1.003 - 1.035    Blood Urine Small (A) NEG^Negative    pH Urine 7.0 5.0 - 7.0 pH    Protein Albumin Urine Negative NEG^Negative mg/dL    Urobilinogen Urine 0.2 0.2 - 1.0 EU/dL    Nitrite Urine Negative NEG^Negative    Leukocyte " Esterase Urine Moderate (A) NEG^Negative    Source Midstream Urine    HCG Qual, Urine ([id number])   Result Value Ref Range    HCG Qual Urine Negative NEG^Negative   Urine Microscopic   Result Value Ref Range    WBC Urine 5-10 (A) OTO5^0 - 5 /HPF    RBC Urine 2-5 (A) OTO2^O - 2 /HPF    Squamous Epithelial /LPF Urine Few FEW^Few /LPF    Bacteria Urine Moderate (A) NEG^Negative /HPF           Assessment & Plan     1. Dysuria  Given history of UTIs, will treat as below.  Last UTI resistant to Bactrim and patient is trying to conceive so will use Keflex.    *UA reflex to Microscopic and Culture ([place] and [facility] (except [place] and [place])  - HCG Qual, Urine ([id number])  - Urine Microscopic  - cephALEXin (KEFLEX) 500 MG capsule; Take 1 capsule (500 mg) by mouth 2 times daily for 7 days  Dispense: 14 capsule; Refill: 0    2. Need for influenza vaccination  - INFLUENZA VACCINE IM > 6 MONTHS VALENT IIV4 [59040]  - ADMIN 1st VACCINE           Return in about 2 days (around 11/27/2019), or if symptoms worsen or fail to improve.    RONI Mills [facility]

## 2019-11-27 LAB
BACTERIA SPEC CULT: ABNORMAL
SPECIMEN SOURCE: ABNORMAL

## 2020-02-04 ENCOUNTER — OFFICE VISIT (OUTPATIENT)
Dept: FAMILY MEDICINE | Facility: CLINIC | Age: 30
End: 2020-02-04
Payer: COMMERCIAL

## 2020-02-04 VITALS
SYSTOLIC BLOOD PRESSURE: 119 MMHG | HEIGHT: 62 IN | BODY MASS INDEX: 21.62 KG/M2 | HEART RATE: 73 BPM | DIASTOLIC BLOOD PRESSURE: 73 MMHG | RESPIRATION RATE: 18 BRPM | OXYGEN SATURATION: 97 % | WEIGHT: 117.5 LBS | TEMPERATURE: 98 F

## 2020-02-04 DIAGNOSIS — N30.00 ACUTE CYSTITIS WITHOUT HEMATURIA: Primary | ICD-10-CM

## 2020-02-04 DIAGNOSIS — R30.0 DYSURIA: ICD-10-CM

## 2020-02-04 LAB
ALBUMIN UR-MCNC: NEGATIVE MG/DL
APPEARANCE UR: CLEAR
BACTERIA #/AREA URNS HPF: ABNORMAL /HPF
BILIRUB UR QL STRIP: NEGATIVE
COLOR UR AUTO: YELLOW
GLUCOSE UR STRIP-MCNC: NEGATIVE MG/DL
HGB UR QL STRIP: ABNORMAL
KETONES UR STRIP-MCNC: NEGATIVE MG/DL
LEUKOCYTE ESTERASE UR QL STRIP: ABNORMAL
MUCOUS THREADS #/AREA URNS LPF: PRESENT /LPF
NITRATE UR QL: POSITIVE
NON-SQ EPI CELLS #/AREA URNS LPF: ABNORMAL /LPF
PH UR STRIP: 6 PH (ref 5–7)
RBC #/AREA URNS AUTO: ABNORMAL /HPF
SOURCE: ABNORMAL
SP GR UR STRIP: <=1.005 (ref 1–1.03)
UROBILINOGEN UR STRIP-ACNC: 0.2 EU/DL (ref 0.2–1)
WBC #/AREA URNS AUTO: ABNORMAL /HPF

## 2020-02-04 PROCEDURE — 99213 OFFICE O/P EST LOW 20 MIN: CPT | Performed by: PHYSICIAN ASSISTANT

## 2020-02-04 PROCEDURE — 81001 URINALYSIS AUTO W/SCOPE: CPT | Performed by: PHYSICIAN ASSISTANT

## 2020-02-04 PROCEDURE — 87086 URINE CULTURE/COLONY COUNT: CPT | Performed by: PHYSICIAN ASSISTANT

## 2020-02-04 RX ORDER — NITROFURANTOIN 25; 75 MG/1; MG/1
100 CAPSULE ORAL 2 TIMES DAILY
Qty: 14 CAPSULE | Refills: 0 | Status: SHIPPED | OUTPATIENT
Start: 2020-02-04 | End: 2020-02-11

## 2020-02-04 ASSESSMENT — PAIN SCALES - GENERAL: PAINLEVEL: SEVERE PAIN (7)

## 2020-02-04 ASSESSMENT — MIFFLIN-ST. JEOR: SCORE: 1221.23

## 2020-02-04 NOTE — PROGRESS NOTES
Subjective     Elvia Dickens is a 29 year old female who presents to clinic today for the following health issues:    HPI   URINARY TRACT SYMPTOMS  Onset: Yesterday    Description:   Painful urination (Dysuria): YES           Frequency: YES  Blood in urine (Hematuria): no   Delay in urine (Hesitency): YES    Intensity: moderate    Progression of Symptoms:  worsening    Accompanying Signs & Symptoms:  Fever/chills: no   Flank pain no   Nausea and vomiting: YES- pregnant  Any vaginal symptoms: none  Abdominal/Pelvic Pain: no     History:   History of frequent UTI's: YES  History of kidney stones: no   Sexually Active: YES  Possibility of pregnancy: Yes    Precipitating factors:   unsure    Therapies Tried and outcome: Pyridium     Is 10 weeks pregnant.  No blood in urine. No abdominal pain. Nauseated but no emesis  No flank pain  Patient Active Problem List   Diagnosis     Labor and delivery, indication for care     Indication for care in labor or delivery      (normal spontaneous vaginal delivery)     Past Surgical History:   Procedure Laterality Date     NO HISTORY OF SURGERY         Social History     Tobacco Use     Smoking status: Never Smoker     Smokeless tobacco: Never Used   Substance Use Topics     Alcohol use: No     Alcohol/week: 0.0 standard drinks     Family History   Problem Relation Age of Onset     No Known Problems Mother      No Known Problems Father      Diabetes No family hx of      Coronary Artery Disease No family hx of      Hypertension No family hx of      Colon Cancer No family hx of      Breast Cancer No family hx of          Current Outpatient Medications   Medication Sig Dispense Refill            Prenatal Vit-Fe Fumarate-FA (PNV PRENATAL PLUS MULTIVITAMIN) 27-1 MG TABS per tablet Take 1 tablet by mouth daily       BP Readings from Last 3 Encounters:   20 119/73   19 121/81   09/10/19 118/77    Wt Readings from Last 3 Encounters:   20 53.3 kg (117 lb 8 oz)   19  53.1 kg (117 lb)   09/10/19 50.3 kg (111 lb)                      Reviewed and updated as needed this visit by Provider  Tobacco  Allergies  Meds  Problems  Med Hx  Surg Hx  Fam Hx  Soc Hx          Review of Systems   ROS COMP: Constitutional, HEENT, cardiovascular, pulmonary, gi and gu systems are negative, except as otherwise noted.      Objective    There were no vitals taken for this visit.  There is no height or weight on file to calculate BMI.  Physical Exam   GENERAL: healthy, alert and no distress  NECK: no adenopathy, no asymmetry, masses, or scars and thyroid normal to palpation  RESP: lungs clear to auscultation - no rales, rhonchi or wheezes  CV: regular rate and rhythm, normal S1 S2, no S3 or S4, no murmur, click or rub, no peripheral edema and peripheral pulses strong  ABDOMEN: soft, nontender, no hepatosplenomegaly, no masses and bowel sounds normal  MS: no gross musculoskeletal defects noted, no edema  BACK: no CVA tenderness, no paralumbar tenderness    Diagnostic Test Results:  Results for orders placed or performed in visit on 02/04/20 (from the past 24 hour(s))   UA reflex to Microscopic and Culture   Result Value Ref Range    Color Urine Yellow     Appearance Urine Clear     Glucose Urine Negative NEG^Negative mg/dL    Bilirubin Urine Negative NEG^Negative    Ketones Urine Negative NEG^Negative mg/dL    Specific Gravity Urine <=1.005 1.003 - 1.035    Blood Urine Moderate (A) NEG^Negative    pH Urine 6.0 5.0 - 7.0 pH    Protein Albumin Urine Negative NEG^Negative mg/dL    Urobilinogen Urine 0.2 0.2 - 1.0 EU/dL    Nitrite Urine Positive (A) NEG^Negative    Leukocyte Esterase Urine Large (A) NEG^Negative    Source Midstream Urine    Urine Microscopic   Result Value Ref Range    WBC Urine 10-25 (A) OTO5^0 - 5 /HPF    RBC Urine 2-5 (A) OTO2^O - 2 /HPF    Squamous Epithelial /LPF Urine Few FEW^Few /LPF    Bacteria Urine Few (A) NEG^Negative /HPF    Mucous Urine Present (A) NEG^Negative /LPF            Assessment & Plan     1. Acute cystitis without hematuria  Culture pending .  Given pregnant will treat with macrobid. Warning signs and symptoms warranting urgent evaluation reviewed.   - Urine Culture Aerobic Bacterial  - nitroFURantoin macrocrystal-monohydrate (MACROBID) 100 MG capsule; Take 1 capsule (100 mg) by mouth 2 times daily for 7 days  Dispense: 14 capsule; Refill: 0    2. Dysuria    - UA reflex to Microscopic and Culture  - Urine Microscopic       Patient Instructions   Take macrobid twice a day for 7 days  Drink lots of liquids   Follow up with us if no improvement in symptoms over the next 3 days  Return urgently if any change in symptoms like abdominal pain, fever, vomiting or other change in symptoms      Return in about 3 days (around 2/7/2020), or if symptoms worsen or fail to improve.    Glory Najera PA-C  [facility]

## 2020-02-04 NOTE — PATIENT INSTRUCTIONS
Take macrobid twice a day for 7 days  Drink lots of liquids   Follow up with us if no improvement in symptoms over the next 3 days  Return urgently if any change in symptoms like abdominal pain, fever, vomiting or other change in symptoms

## 2020-02-05 LAB
BACTERIA SPEC CULT: NORMAL
SPECIMEN SOURCE: NORMAL

## 2020-02-28 LAB
ABO + RH BLD: NORMAL
ABO + RH BLD: NORMAL
BLD GP AB SCN SERPL QL: NEGATIVE
C TRACH DNA SPEC QL PROBE+SIG AMP: NEGATIVE
HBV SURFACE AG SERPL QL IA: NON REACTIVE
HIV 1+2 AB+HIV1 P24 AG SERPL QL IA: NON REACTIVE
N GONORRHOEA DNA SPEC QL PROBE+SIG AMP: NEGATIVE
RUBELLA ABY IGG: NORMAL
TREPONEMA ANTIBODIES: NON REACTIVE

## 2020-09-04 ENCOUNTER — ANESTHESIA (OUTPATIENT)
Dept: OBGYN | Facility: CLINIC | Age: 30
End: 2020-09-04
Payer: COMMERCIAL

## 2020-09-04 ENCOUNTER — HOSPITAL ENCOUNTER (INPATIENT)
Facility: CLINIC | Age: 30
LOS: 2 days | Discharge: HOME | End: 2020-09-06
Attending: OBSTETRICS & GYNECOLOGY | Admitting: OBSTETRICS & GYNECOLOGY
Payer: COMMERCIAL

## 2020-09-04 ENCOUNTER — ANESTHESIA EVENT (OUTPATIENT)
Dept: OBGYN | Facility: CLINIC | Age: 30
End: 2020-09-04
Payer: COMMERCIAL

## 2020-09-04 LAB
ABO + RH BLD: NORMAL
ABO + RH BLD: NORMAL
LABORATORY COMMENT REPORT: NORMAL
RUPTURE OF FETAL MEMBRANES BY ROM PLUS: POSITIVE
SARS-COV-2 RNA SPEC QL NAA+PROBE: NEGATIVE
SARS-COV-2 RNA SPEC QL NAA+PROBE: NORMAL
SPECIMEN EXP DATE BLD: NORMAL
SPECIMEN SOURCE: NORMAL
SPECIMEN SOURCE: NORMAL

## 2020-09-04 PROCEDURE — 25000125 ZZHC RX 250: Performed by: OBSTETRICS & GYNECOLOGY

## 2020-09-04 PROCEDURE — 36415 COLL VENOUS BLD VENIPUNCTURE: CPT | Performed by: OBSTETRICS & GYNECOLOGY

## 2020-09-04 PROCEDURE — 37000011 ZZH ANESTHESIA WARD SERVICE

## 2020-09-04 PROCEDURE — 25000128 H RX IP 250 OP 636: Performed by: ANESTHESIOLOGY

## 2020-09-04 PROCEDURE — 59025 FETAL NON-STRESS TEST: CPT

## 2020-09-04 PROCEDURE — 25800030 ZZH RX IP 258 OP 636: Performed by: ANESTHESIOLOGY

## 2020-09-04 PROCEDURE — 84112 EVAL AMNIOTIC FLUID PROTEIN: CPT | Performed by: OBSTETRICS & GYNECOLOGY

## 2020-09-04 PROCEDURE — 86900 BLOOD TYPING SEROLOGIC ABO: CPT | Performed by: OBSTETRICS & GYNECOLOGY

## 2020-09-04 PROCEDURE — 25800030 ZZH RX IP 258 OP 636: Performed by: OBSTETRICS & GYNECOLOGY

## 2020-09-04 PROCEDURE — 12000000 ZZH R&B MED SURG/OB

## 2020-09-04 PROCEDURE — 86901 BLOOD TYPING SEROLOGIC RH(D): CPT | Performed by: OBSTETRICS & GYNECOLOGY

## 2020-09-04 PROCEDURE — 86780 TREPONEMA PALLIDUM: CPT | Performed by: OBSTETRICS & GYNECOLOGY

## 2020-09-04 PROCEDURE — 25000125 ZZHC RX 250: Performed by: ANESTHESIOLOGY

## 2020-09-04 PROCEDURE — U0003 INFECTIOUS AGENT DETECTION BY NUCLEIC ACID (DNA OR RNA); SEVERE ACUTE RESPIRATORY SYNDROME CORONAVIRUS 2 (SARS-COV-2) (CORONAVIRUS DISEASE [COVID-19]), AMPLIFIED PROBE TECHNIQUE, MAKING USE OF HIGH THROUGHPUT TECHNOLOGIES AS DESCRIBED BY CMS-2020-01-R: HCPCS | Performed by: OBSTETRICS & GYNECOLOGY

## 2020-09-04 PROCEDURE — G0463 HOSPITAL OUTPT CLINIC VISIT: HCPCS | Mod: 25

## 2020-09-04 RX ORDER — NALOXONE HYDROCHLORIDE 0.4 MG/ML
.1-.4 INJECTION, SOLUTION INTRAMUSCULAR; INTRAVENOUS; SUBCUTANEOUS
Status: DISCONTINUED | OUTPATIENT
Start: 2020-09-04 | End: 2020-09-06 | Stop reason: HOSPADM

## 2020-09-04 RX ORDER — TRANEXAMIC ACID 10 MG/ML
1 INJECTION, SOLUTION INTRAVENOUS EVERY 30 MIN PRN
Status: DISCONTINUED | OUTPATIENT
Start: 2020-09-04 | End: 2020-09-05

## 2020-09-04 RX ORDER — ONDANSETRON 2 MG/ML
4 INJECTION INTRAMUSCULAR; INTRAVENOUS EVERY 6 HOURS PRN
Status: DISCONTINUED | OUTPATIENT
Start: 2020-09-04 | End: 2020-09-06 | Stop reason: HOSPADM

## 2020-09-04 RX ORDER — SODIUM CHLORIDE, SODIUM LACTATE, POTASSIUM CHLORIDE, CALCIUM CHLORIDE 600; 310; 30; 20 MG/100ML; MG/100ML; MG/100ML; MG/100ML
INJECTION, SOLUTION INTRAVENOUS CONTINUOUS
Status: DISCONTINUED | OUTPATIENT
Start: 2020-09-04 | End: 2020-09-05

## 2020-09-04 RX ORDER — EPHEDRINE SULFATE 50 MG/ML
5 INJECTION, SOLUTION INTRAMUSCULAR; INTRAVENOUS; SUBCUTANEOUS
Status: DISCONTINUED | OUTPATIENT
Start: 2020-09-04 | End: 2020-09-06 | Stop reason: HOSPADM

## 2020-09-04 RX ORDER — IBUPROFEN 400 MG/1
800 TABLET, FILM COATED ORAL
Status: DISCONTINUED | OUTPATIENT
Start: 2020-09-04 | End: 2020-09-05

## 2020-09-04 RX ORDER — ONDANSETRON 4 MG/1
4 TABLET, ORALLY DISINTEGRATING ORAL EVERY 6 HOURS PRN
Status: DISCONTINUED | OUTPATIENT
Start: 2020-09-04 | End: 2020-09-06 | Stop reason: HOSPADM

## 2020-09-04 RX ORDER — LIDOCAINE 40 MG/G
CREAM TOPICAL
Status: DISCONTINUED | OUTPATIENT
Start: 2020-09-04 | End: 2020-09-06 | Stop reason: HOSPADM

## 2020-09-04 RX ORDER — OXYTOCIN/0.9 % SODIUM CHLORIDE 30/500 ML
1-24 PLASTIC BAG, INJECTION (ML) INTRAVENOUS CONTINUOUS
Status: DISCONTINUED | OUTPATIENT
Start: 2020-09-04 | End: 2020-09-06 | Stop reason: HOSPADM

## 2020-09-04 RX ORDER — ROPIVACAINE HYDROCHLORIDE 2 MG/ML
10 INJECTION, SOLUTION EPIDURAL; INFILTRATION; PERINEURAL ONCE
Status: COMPLETED | OUTPATIENT
Start: 2020-09-04 | End: 2020-09-04

## 2020-09-04 RX ORDER — OXYCODONE AND ACETAMINOPHEN 5; 325 MG/1; MG/1
1 TABLET ORAL
Status: DISCONTINUED | OUTPATIENT
Start: 2020-09-04 | End: 2020-09-05

## 2020-09-04 RX ORDER — OXYTOCIN 10 [USP'U]/ML
10 INJECTION, SOLUTION INTRAMUSCULAR; INTRAVENOUS
Status: DISCONTINUED | OUTPATIENT
Start: 2020-09-04 | End: 2020-09-05

## 2020-09-04 RX ORDER — ACETAMINOPHEN 325 MG/1
650 TABLET ORAL EVERY 4 HOURS PRN
Status: DISCONTINUED | OUTPATIENT
Start: 2020-09-04 | End: 2020-09-05

## 2020-09-04 RX ORDER — CARBOPROST TROMETHAMINE 250 UG/ML
250 INJECTION, SOLUTION INTRAMUSCULAR
Status: DISCONTINUED | OUTPATIENT
Start: 2020-09-04 | End: 2020-09-06 | Stop reason: HOSPADM

## 2020-09-04 RX ORDER — OXYTOCIN/0.9 % SODIUM CHLORIDE 30/500 ML
100-340 PLASTIC BAG, INJECTION (ML) INTRAVENOUS CONTINUOUS PRN
Status: DISCONTINUED | OUTPATIENT
Start: 2020-09-04 | End: 2020-09-05

## 2020-09-04 RX ORDER — ONDANSETRON 2 MG/ML
4 INJECTION INTRAMUSCULAR; INTRAVENOUS EVERY 6 HOURS PRN
Status: DISCONTINUED | OUTPATIENT
Start: 2020-09-04 | End: 2020-09-05

## 2020-09-04 RX ORDER — LIDOCAINE HYDROCHLORIDE AND EPINEPHRINE 15; 5 MG/ML; UG/ML
3 INJECTION, SOLUTION EPIDURAL
Status: COMPLETED | OUTPATIENT
Start: 2020-09-04 | End: 2020-09-04

## 2020-09-04 RX ORDER — NALOXONE HYDROCHLORIDE 0.4 MG/ML
.1-.4 INJECTION, SOLUTION INTRAMUSCULAR; INTRAVENOUS; SUBCUTANEOUS
Status: DISCONTINUED | OUTPATIENT
Start: 2020-09-04 | End: 2020-09-05

## 2020-09-04 RX ORDER — METHYLERGONOVINE MALEATE 0.2 MG/ML
200 INJECTION INTRAVENOUS
Status: DISCONTINUED | OUTPATIENT
Start: 2020-09-04 | End: 2020-09-06 | Stop reason: HOSPADM

## 2020-09-04 RX ADMIN — Medication 2 MILLI-UNITS/MIN: at 22:48

## 2020-09-04 RX ADMIN — SODIUM CHLORIDE, POTASSIUM CHLORIDE, SODIUM LACTATE AND CALCIUM CHLORIDE: 600; 310; 30; 20 INJECTION, SOLUTION INTRAVENOUS at 21:45

## 2020-09-04 RX ADMIN — Medication: at 21:26

## 2020-09-04 RX ADMIN — SODIUM CHLORIDE, POTASSIUM CHLORIDE, SODIUM LACTATE AND CALCIUM CHLORIDE: 600; 310; 30; 20 INJECTION, SOLUTION INTRAVENOUS at 22:10

## 2020-09-04 RX ADMIN — SODIUM CHLORIDE, POTASSIUM CHLORIDE, SODIUM LACTATE AND CALCIUM CHLORIDE 500 ML: 600; 310; 30; 20 INJECTION, SOLUTION INTRAVENOUS at 20:40

## 2020-09-04 RX ADMIN — LIDOCAINE HYDROCHLORIDE AND EPINEPHRINE 3 ML: 15; 5 INJECTION, SOLUTION EPIDURAL at 21:28

## 2020-09-04 RX ADMIN — ROPIVACAINE HYDROCHLORIDE 10 ML: 2 INJECTION, SOLUTION EPIDURAL; INFILTRATION at 21:32

## 2020-09-05 LAB — T PALLIDUM AB SER QL: NONREACTIVE

## 2020-09-05 PROCEDURE — 25000125 ZZHC RX 250: Performed by: OBSTETRICS & GYNECOLOGY

## 2020-09-05 PROCEDURE — 12000035 ZZH R&B POSTPARTUM

## 2020-09-05 PROCEDURE — 72200001 ZZH LABOR CARE VAGINAL DELIVERY SINGLE

## 2020-09-05 PROCEDURE — 25000132 ZZH RX MED GY IP 250 OP 250 PS 637: Performed by: OBSTETRICS & GYNECOLOGY

## 2020-09-05 RX ORDER — OXYTOCIN 10 [USP'U]/ML
10 INJECTION, SOLUTION INTRAMUSCULAR; INTRAVENOUS
Status: DISCONTINUED | OUTPATIENT
Start: 2020-09-05 | End: 2020-09-06 | Stop reason: HOSPADM

## 2020-09-05 RX ORDER — IBUPROFEN 800 MG/1
800 TABLET, FILM COATED ORAL EVERY 6 HOURS PRN
Qty: 90 TABLET | Refills: 0 | Status: SHIPPED | OUTPATIENT
Start: 2020-09-05

## 2020-09-05 RX ORDER — BISACODYL 10 MG
10 SUPPOSITORY, RECTAL RECTAL DAILY PRN
Status: DISCONTINUED | OUTPATIENT
Start: 2020-09-07 | End: 2020-09-06 | Stop reason: HOSPADM

## 2020-09-05 RX ORDER — HYDROCODONE BITARTRATE AND ACETAMINOPHEN 5; 325 MG/1; MG/1
1 TABLET ORAL EVERY 4 HOURS PRN
Status: DISCONTINUED | OUTPATIENT
Start: 2020-09-05 | End: 2020-09-06 | Stop reason: HOSPADM

## 2020-09-05 RX ORDER — OXYTOCIN/0.9 % SODIUM CHLORIDE 30/500 ML
100 PLASTIC BAG, INJECTION (ML) INTRAVENOUS CONTINUOUS
Status: DISCONTINUED | OUTPATIENT
Start: 2020-09-05 | End: 2020-09-06 | Stop reason: HOSPADM

## 2020-09-05 RX ORDER — AMOXICILLIN 250 MG
2 CAPSULE ORAL 2 TIMES DAILY
Status: DISCONTINUED | OUTPATIENT
Start: 2020-09-05 | End: 2020-09-06 | Stop reason: HOSPADM

## 2020-09-05 RX ORDER — TRANEXAMIC ACID 10 MG/ML
1 INJECTION, SOLUTION INTRAVENOUS EVERY 30 MIN PRN
Status: DISCONTINUED | OUTPATIENT
Start: 2020-09-05 | End: 2020-09-06 | Stop reason: HOSPADM

## 2020-09-05 RX ORDER — MODIFIED LANOLIN
OINTMENT (GRAM) TOPICAL
Status: DISCONTINUED | OUTPATIENT
Start: 2020-09-05 | End: 2020-09-06 | Stop reason: HOSPADM

## 2020-09-05 RX ORDER — HYDROCORTISONE 2.5 %
CREAM (GRAM) TOPICAL 3 TIMES DAILY PRN
Status: DISCONTINUED | OUTPATIENT
Start: 2020-09-05 | End: 2020-09-06 | Stop reason: HOSPADM

## 2020-09-05 RX ORDER — ACETAMINOPHEN 325 MG/1
650 TABLET ORAL EVERY 4 HOURS PRN
Status: DISCONTINUED | OUTPATIENT
Start: 2020-09-05 | End: 2020-09-06 | Stop reason: HOSPADM

## 2020-09-05 RX ORDER — AMOXICILLIN 250 MG
1 CAPSULE ORAL 2 TIMES DAILY
Status: DISCONTINUED | OUTPATIENT
Start: 2020-09-05 | End: 2020-09-06 | Stop reason: HOSPADM

## 2020-09-05 RX ORDER — NALOXONE HYDROCHLORIDE 0.4 MG/ML
.1-.4 INJECTION, SOLUTION INTRAMUSCULAR; INTRAVENOUS; SUBCUTANEOUS
Status: DISCONTINUED | OUTPATIENT
Start: 2020-09-05 | End: 2020-09-06 | Stop reason: HOSPADM

## 2020-09-05 RX ORDER — HYDROMORPHONE HYDROCHLORIDE 1 MG/ML
0.3 INJECTION, SOLUTION INTRAMUSCULAR; INTRAVENOUS; SUBCUTANEOUS
Status: DISCONTINUED | OUTPATIENT
Start: 2020-09-05 | End: 2020-09-06 | Stop reason: HOSPADM

## 2020-09-05 RX ORDER — OXYTOCIN/0.9 % SODIUM CHLORIDE 30/500 ML
340 PLASTIC BAG, INJECTION (ML) INTRAVENOUS CONTINUOUS PRN
Status: DISCONTINUED | OUTPATIENT
Start: 2020-09-05 | End: 2020-09-06 | Stop reason: HOSPADM

## 2020-09-05 RX ORDER — IBUPROFEN 400 MG/1
800 TABLET, FILM COATED ORAL EVERY 6 HOURS PRN
Status: DISCONTINUED | OUTPATIENT
Start: 2020-09-05 | End: 2020-09-06 | Stop reason: HOSPADM

## 2020-09-05 RX ADMIN — IBUPROFEN 800 MG: 400 TABLET ORAL at 22:45

## 2020-09-05 RX ADMIN — DOCUSATE SODIUM AND SENNOSIDES 1 TABLET: 8.6; 5 TABLET, FILM COATED ORAL at 08:51

## 2020-09-05 RX ADMIN — IBUPROFEN 800 MG: 400 TABLET ORAL at 03:52

## 2020-09-05 RX ADMIN — ACETAMINOPHEN 650 MG: 325 TABLET, FILM COATED ORAL at 16:39

## 2020-09-05 RX ADMIN — ACETAMINOPHEN 650 MG: 325 TABLET, FILM COATED ORAL at 22:46

## 2020-09-05 RX ADMIN — IBUPROFEN 800 MG: 400 TABLET ORAL at 09:35

## 2020-09-05 RX ADMIN — ACETAMINOPHEN 650 MG: 325 TABLET, FILM COATED ORAL at 09:35

## 2020-09-05 RX ADMIN — IBUPROFEN 800 MG: 400 TABLET ORAL at 16:39

## 2020-09-05 RX ADMIN — Medication 340 ML/HR: at 00:36

## 2020-09-05 RX ADMIN — ACETAMINOPHEN 650 MG: 325 TABLET, FILM COATED ORAL at 03:52

## 2020-09-05 NOTE — PLAN OF CARE
Pt arrived to unit via wheelchair at 0335 with infant in arms.  Pt oriented to room.  Call light within reach.  Pt educated on infant safety.  Pt verbally acknowledged understanding of teaching.  VSS on RA.  Fundus firm, midline, U/1.  Scant lochia rubra.  Voiding adequately.  Up independently.  Pain managed w/Tylenol and ibuprofen.  Breastfeeding attempt on unit, infant sleepy at breast.  Bonding well w/infant.  Spouse supportive at bedside.  Nursing to continue to monitor.

## 2020-09-05 NOTE — L&D DELIVERY NOTE
This is a 31 y/o @39 weeks presents to L&D for active labor management. She said her water broke at 1530. Contractions started shortly after that. She arrived dilated to 6 cm at around 1900. She has had an uncomplicated pregnancy. Labs are A+, ab neg, rubella immune. GBS negative. She did opt for an epidural. She was complete at around 0015 and was nearly  then. She only pushed with one contraction to deliver a  of viable male at 0034. Baby's weight not known at this time. Apgars 9, 9. Placenta delivered spontaneously, intact, 3vc. No lacerations and EBL was 100 ml.

## 2020-09-05 NOTE — ANESTHESIA POSTPROCEDURE EVALUATION
Patient: Elvia Dickens    * No procedures listed *    Diagnosis:* No pre-op diagnosis entered *  Diagnosis Additional Information: No value filed.    Anesthesia Type:  No value filed.    Note:  Anesthesia Post Evaluation    Patient location during evaluation: Bedside  Patient participation: Able to fully participate in evaluation  Level of consciousness: awake and alert  Pain management: adequate  Airway patency: patent  Cardiovascular status: hemodynamically stable  Respiratory status: nonlabored ventilation and unassisted  Hydration status: euvolemic  PONV: none       Comments: No complications. Patient is ambulatory, has voided, denies back pain.         Last vitals:  Vitals:    09/05/20 0352 09/05/20 0430 09/05/20 0852   BP:   110/62   Pulse:   83   Resp: 16 16 16   Temp:   36.5  C (97.7  F)   SpO2:            Electronically Signed By: Jarocho Brewer MD  September 5, 2020  2:54 PM

## 2020-09-05 NOTE — PLAN OF CARE
Data: Elvia Dickens transferred to 429 via wheelchair at 0315. Baby transferred via parent's arms.  Action: Receiving unit notified of transfer: Yes. Patient and family notified of room change. Report given to Ela MIRANDA RN at 0320. Belongings sent to receiving unit. Accompanied by Registered Nurse. Oriented patient to surroundings. Call light within reach. ID bands double-checked with receiving RN.  Response: Patient tolerated transfer and is stable.

## 2020-09-05 NOTE — H&P
[facility] Labor and Delivery History and Physical    Elvia Dickens MRN# [id number]   Age: 30 year old YOB: 1990     Date of Admission:  2020    Primary care provider: Ary Sandhu           Chief Complaint:   Elvia Dickens is a 30 year old female who is 39w1d pregnant and being admitted for active labor management.          Pregnancy history:     OBSTETRIC HISTORY:    OB History    Para Term  AB Living   2 1 1 0 0 1   SAB TAB Ectopic Multiple Live Births   0 0 0 0 1      # Outcome Date GA Lbr Maynor/2nd Weight Sex Delivery Anes PTL Lv   2 Current            1 Term 18 38w6d 09:33 / 01:15 3.35 kg (7 lb 6.2 oz) M Vag-Spont   MIKEY      Name: MARY DICKENS      Apgar1: 8  Apgar5: 9       EDC: Estimated Date of Delivery: Sep 11, 2020    Prenatal Labs:   Lab Results   Component Value Date    ABO A 2020    RH Pos 2020    AS Negative 2020    HEPBANG non reactive 2020    CHPCRT negative 2020    GCPCRT negative 2020    TREPAB Negative 2018    RUBELLAABIGG immune 2020    HGB 9.9 (L) 2018       GBS Status:   Lab Results   Component Value Date    GBS neg 2018       Active Problem List  Patient Active Problem List   Diagnosis     Labor and delivery, indication for care     Indication for care in labor or delivery      (normal spontaneous vaginal delivery)     Normal vaginal delivery       Medication Prior to Admission  Medications Prior to Admission   Medication Sig Dispense Refill Last Dose     Prenatal Vit-Fe Fumarate-FA (PNV PRENATAL PLUS MULTIVITAMIN) 27-1 MG TABS per tablet Take 1 tablet by mouth daily   2020 at Unknown time   .        Maternal Past Medical History:     Past Medical History:   Diagnosis Date     Anemia      Charcot-Sara-Tooth disease                        Family History:   This patient has no significant family history            Social History:     Social History     Tobacco  Use     Smoking status: Never Smoker     Smokeless tobacco: Never Used   Substance Use Topics     Alcohol use: No     Alcohol/week: 0.0 standard drinks            Review of Systems:   C: NEGATIVE for fever, chills, change in weight  E/M: NEGATIVE for ear, mouth and throat problems  R: NEGATIVE for significant cough or SOB  CV: NEGATIVE for chest pain, palpitations or peripheral edema          Physical Exam:   Vitals were reviewed    Constitutional: Awake, alert, cooperative, no apparent distress, and appears stated age.  Hematologic / Lymphatic: No cervical lymphadenopathy and no supraclavicular lymphadenopathy.  Back: Symmetric, no curvature, spinous processes are non-tender on palpation, paraspinous muscles are non-tender on palpation, no costal vertebral tenderness.  Lungs: No increased work of breathing, good air exchange, clear to auscultation bilaterally, no crackles or wheezing.  Cardiovascular: Regular rate and rhythm, normal S1 and S2, no S3 or S4, and no murmur noted.  Abdomen: No scars, normal bowel sounds, soft, non-distended, non-tender, no masses palpated, no hepatosplenomegally.  Genitourinary: No urethral discharge, normal external genitalia, no hernia.  Neuropsychiatric: Normal affect, mood, orientation, memory and insight.  Skin: No rashes, erythema, pallor, petechia or purpura.     Cervix:   Membranes: SROM   Dilation: 10   Effacement: 100%   Station:+3    Presentation:Vertex  Fetal Heart Rate Tracing: Tier 1 (normal)  Tocometer: external monitor                       Assessment:   Elvia Dickens is a 39w1d pregnant female admitted with active labor management.          Plan:   Anticipate     Ruben Ramírez MD

## 2020-09-05 NOTE — PLAN OF CARE
Data: [facility]   Reason for maternal/fetal assessment per patient is Rule out rupture of membranes  .  Patient is a . Prenatal record reviewed.   Gestational Age 39w0d. VSS. Fetal movement present. Patient denies cramping, backache, vaginal discharge, pelvic pressure, UTI symptoms, GI problems, bloody show, vaginal bleeding, edema, headache, visual disturbances, epigastric or URQ pain, abdominal pain. Support persons Hemal present.  Action: Verbal consent for EFM. Triage assessment completed. EFM applied for. Uterine assessment complete. Fetal assessment: Presumed adequate fetal oxygenation documented (see flow record).   Response: Dr. Ramírez informed of status. Plan per provider is admit. Patient verbalized agreement with plan. Patient transferred to room 212 ambulatory, oriented to room and call light. Report given to COLLINS Lakhani RN.

## 2020-09-05 NOTE — PLAN OF CARE
Feels well.  Vitals stable. Oral pain meds working. Breast feeding without difficulty. Will continue to monitor.

## 2020-09-05 NOTE — ANESTHESIA PROCEDURE NOTES
Procedure note : epidural catheter  Staff -   Anesthesiologist:  Roger Lopez MD      Performed By: anesthesiologist        Pre-Procedure  Performed by Roger Lopez MD  Location: OB      Pre-Anesthestic Checklist: patient identified, IV checked, risks and benefits discussed, informed consent, monitors and equipment checked, pre-op evaluation and at physician/surgeon's request    Timeout  Correct Patient: Yes   Correct Procedure: Yes   Correct Site: Yes   Correct Laterality: N/A   Correct Position: Yes   Site Marked: N/A   .   Procedure Documentation    .    Procedure: epidural catheter, .   Patient Position:sitting Insertion Site:L4-5  (midline approach) Injection technique: LORT saline   Local skin infiltrated with mL of 1% lidocaine.  CHRIS at 5 cm    Patient Prep/Sterile Barriers; mask, sterile gloves, povidone-iodine 7.5% surgical scrub, patient draped.  .  Needle: Touhy needle   Needle Gauge: 17.    Needle Length (Inches) 3.5   # of attempts: 1 and  # of redirects:  1 .    . .  Catheter threaded easily  .  10 cm at skin.   .    Assessment/Narrative  Paresthesias: No.  .  .  Aspiration negative for heme or CSF  . Test dose of 3 mL lidocaine 1.5% w/ 1:200,000 epinephrine at. Test dose negative for signs of intravascular, subdural or intrathecal injection. Comments:  No complications.  Catheter secured with sterile dressing and tape.  Questions answered.

## 2020-09-05 NOTE — ANESTHESIA PREPROCEDURE EVALUATION
"Anesthesia Pre-Procedure Evaluation    Patient: Elvia Dickens   MRN: [id number] : 1990          Preoperative Diagnosis: * No surgery found *        Past Medical History:   Diagnosis Date     Anemia      Charcot-Sara-Tooth disease      Past Surgical History:   Procedure Laterality Date     NO HISTORY OF SURGERY                          Lab Results   Component Value Date    HGB 9.9 (L) 2018    HCG Negative 2019       Preop Vitals  BP Readings from Last 3 Encounters:   20 112/68   20 119/73   19 121/81    Pulse Readings from Last 3 Encounters:   20 73   19 86   09/10/19 124      Resp Readings from Last 3 Encounters:   20 18   19 20   09/10/19 18    SpO2 Readings from Last 3 Encounters:   20 97%   19 99%   09/10/19 94%      Temp Readings from Last 1 Encounters:   20 36.9  C (98.4  F) (Temporal)    Ht Readings from Last 1 Encounters:   20 1.575 m (5' 2\")      Wt Readings from Last 1 Encounters:   20 53.3 kg (117 lb 8 oz)    Estimated body mass index is 21.49 kg/m  as calculated from the following:    Height as of 20: 1.575 m (5' 2\").    Weight as of 20: 53.3 kg (117 lb 8 oz).       Anesthesia Plan      History & Physical Review      ASA Status:  2 .        Plan for Epidural            Postoperative Care      Consents                 CAMDEN BUTLER MD  "

## 2020-09-05 NOTE — PLAN OF CARE
Pt is a 29 yo at 39wOd presenting in labor. Blood type A+, Hep B neg, Rubella immune, GBS neg. SROM at 1515 at home. Admitted to room 212 at 2010. [place] and external US applied. [facility]. Cat I tracing, +FM.

## 2020-09-05 NOTE — LACTATION NOTE
Initial Lactation visit with Elvia, significant other Hemal, & baby dylan Josefina. Elvia reports feeding is going well so far. Baby is sleepy at times. Discussed typical  feedings in first 1-2 days of life. Encouraged frequent feedings every 2-3 hours and lots of skin to skin.    Recommend unlimited, frequent breast feedings: At least 8 - 12 times every 24 hours. Recommended rooming in. Instructed in hand expression. Avoid pacifiers and supplementation with formula unless medically indicated. Explained benefits of holding baby skin on skin to help promote better breastfeeding outcomes. Elvia wants a new breast pump for home use. Elvia & Hemal appreciative of visit. Will revisit as needed.    Rosa Clay, RN-C, IBCLC, MNN, PHN, BSN

## 2020-09-05 NOTE — PROGRESS NOTES
Elvia Maninder  2020  PPD#0 s/p     S: Pt doing well with no acute events overnight.  Pain well controlled;  ambulating without difficulty; tolerating po intake; passing flatus.  Denies SOB, CP, HA, nausea/vomiting, fevers/chills.  Decreased lochia.  Breastfeeding without difficulty.    Pt desires circumcision for infant prior to discharge.  Given infant was born 9 hours ago and infant has not yet been seen by pediatrician, will defer until tomorrow.    O: /62   Pulse 83   Temp 97.7  F (36.5  C) (Oral)   Resp 16   LMP 11/10/2019   SpO2 94%   Breastfeeding Unknown   No results for input(s): HGB in the last 168 hours.  Abdomen: soft, non tender, fundus firm 2 cm, below the umbilicus  Ext: non tender, no edema or erythema    A/P: s/p  PPD #0 /p   Doing well  Continue routine post partum care  Discussed contraceptive options, which will be readdressed at 6 week post-partum appointment.  Discharge planning for tomorrow.  Pt desires circumcision prior to discharge.    Alexandra Martinez MD

## 2020-09-06 VITALS
OXYGEN SATURATION: 94 % | SYSTOLIC BLOOD PRESSURE: 114 MMHG | TEMPERATURE: 98.5 F | DIASTOLIC BLOOD PRESSURE: 71 MMHG | RESPIRATION RATE: 16 BRPM | HEART RATE: 68 BPM

## 2020-09-06 PROCEDURE — 25000132 ZZH RX MED GY IP 250 OP 250 PS 637: Performed by: OBSTETRICS & GYNECOLOGY

## 2020-09-06 RX ADMIN — IBUPROFEN 800 MG: 400 TABLET ORAL at 04:58

## 2020-09-06 RX ADMIN — DOCUSATE SODIUM AND SENNOSIDES 1 TABLET: 8.6; 5 TABLET, FILM COATED ORAL at 07:41

## 2020-09-06 RX ADMIN — ACETAMINOPHEN 650 MG: 325 TABLET, FILM COATED ORAL at 04:57

## 2020-09-06 NOTE — PLAN OF CARE
Vitals stable. Oral pain medications working. Feels well. Breast feeding without difficulty.Ready for discharge home with baby.

## 2020-09-06 NOTE — PROGRESS NOTES
[facility]       DAILY NOTE - POSTPARTUM DAY 1     SUBJECTIVE:     Pain controlled? Yes  Tolerating a regular diet? YES  Ambulating? YES  Voiding without difficulty? Yes    OBJECTIVE:  Vitals:    20 2245 20 2300 20 2330 20 0739   BP:  108/67  114/71   BP Location:  Left arm     Pulse:  79  68   Resp: 16 16 16 16   Temp:  98.5  F (36.9  C)  98.5  F (36.9  C)   TempSrc:  Oral  Oral   SpO2:           Constitutional: healthy, alert and no distress    Abdomen:  Uterine fundus is firm, non-tender and at the level of the umbilicus      LABS:  Hemoglobin   Date Value Ref Range Status   2018 9.9 (L) 11.7 - 15.7 g/dL Final       ASSESSMENT:  Post-partum day #1 s/p   Pregnancy complicated by NO COMPLICATIONS    Doing well.       PLAN:   Discharge today.  Return to clinic in 6 weeks.   Continue routine postpartum cares    Ruben Ramírez MD

## 2020-09-06 NOTE — LACTATION NOTE
Routine visit. Elvia is discharging home today with her baby and . She states breastfeeding is going well and denies questions or concerns. Recommended she continue using infant feeding log to track feedings, voids and stools and use outpatient lactation resources as needed. Elvia and her  appreciative of my visit.

## 2020-09-06 NOTE — DISCHARGE INSTRUCTIONS

## 2020-09-06 NOTE — PLAN OF CARE
VSS on RA.  Fundus firm, midline, U/1.  Scant lochia rubra, no clots noted.  Voiding adequately.  Up independently.  Pain managed w/ibuprofen and Tylenol.  Bonding well w/infant.  Independent w/infant cares.  Spouse at bedside.  Nursing to continue to monitor.

## 2020-12-27 ENCOUNTER — HEALTH MAINTENANCE LETTER (OUTPATIENT)
Age: 30
End: 2020-12-27

## 2021-01-15 ENCOUNTER — HEALTH MAINTENANCE LETTER (OUTPATIENT)
Age: 31
End: 2021-01-15

## 2021-10-09 ENCOUNTER — HEALTH MAINTENANCE LETTER (OUTPATIENT)
Age: 31
End: 2021-10-09

## 2022-01-29 ENCOUNTER — HEALTH MAINTENANCE LETTER (OUTPATIENT)
Age: 32
End: 2022-01-29

## 2022-09-11 ENCOUNTER — HEALTH MAINTENANCE LETTER (OUTPATIENT)
Age: 32
End: 2022-09-11

## 2023-05-06 ENCOUNTER — HEALTH MAINTENANCE LETTER (OUTPATIENT)
Age: 33
End: 2023-05-06